# Patient Record
Sex: MALE | Race: WHITE | Employment: OTHER | URBAN - METROPOLITAN AREA
[De-identification: names, ages, dates, MRNs, and addresses within clinical notes are randomized per-mention and may not be internally consistent; named-entity substitution may affect disease eponyms.]

---

## 2019-02-05 ENCOUNTER — DOCUMENTATION ONLY (OUTPATIENT)
Dept: INTERNAL MEDICINE CLINIC | Age: 74
End: 2019-02-05

## 2019-02-05 NOTE — PROGRESS NOTES
Medicare Part B Preventive Services Guidelines/Limitations Date last completed and Frequency Due Date   Bone Mass Measurement  (age 72 & older, biennial) Requires diagnosis related to osteoporosis or estrogen deficiency. Biennial benefit unless patient has history of long-term glucocorticoid tx or baseline is needed because initial test was by other method N/A N/A   Cardiovascular Screening Blood Tests (every 5 years)  Total cholesterol, HDL, Triglycerides Order as a panel if possible Completed 1/2015    Recommended annually Due now   Colorectal Cancer Screening  -Fecal occult blood test (annual)  -Flexible sigmoidoscopy (5y)  -Screening colonoscopy (10y)  -Barium Enema  Completed 2/2016 with Dr Sade Boyle    Recommended in 5 years  Due  2/2021   Counseling to Prevent Tobacco Use (up to 8 sessions per year)  - Counseling greater than 3 and up to 10 minutes  - Counseling greater than 10 minutes Patients must be asymptomatic of tobacco-related conditions to receive as preventive service N/A N/A   Diabetes Screening Tests (at least every 3 years, Medicare covers annually or at 6-month intervals for prediabetic patients)    Fasting blood sugar (FBS) or glucose tolerance test (GTT) Patient must be diagnosed with one of the following:  -Hypertension, Dyslipidemia, obesity, previous impaired FBS or GTT  Or any two of the following: overweight, FH of diabetes, age ? 72, history of gestational diabetes, birth of baby weighing more than 9 pounds Completed in the past      Recommended every 3 years for non-diabetics    Recommended every 3-6 months for Pre-Diabetics and Diabetics Due now   Diabetes Self-Management Training (DSMT) (no USPSTF recommendation) Requires referral by treating physician for patient with diabetes or renal disease. 10 hours of initial DSMT session of no less than 30 minutes each in a continuous 12-month period. 2 hours of follow-up DSMT in subsequent years.  N/A N/A   Glaucoma Screening (no USPSTF recommendation) Diabetes mellitus, family history, , age 48 or over,  American, age 72 or over Completed in the past    Recommended annually Due now   Human Immunodeficiency Virus (HIV) Screening (annually for increased risk patients)  HIV-1 and HIV-2 by EIA, [place], rapid antibody test, or oral mucosa transudate Patient must be at increased risk for HIV infection per USPSTF guidelines or pregnant. Tests covered annually for patients at increased risk. Pregnant patients may receive up to 3 test during pregnancy. N/A N/A   Medical Nutrition Therapy (MNT) (for diabetes or renal disease not recommended schedule) Requires referral by treating physician for patient with diabetes or renal disease. Can be provided in same year as diabetes self-management training (DSMT), and CMS recommends medical nutrition therapy take place after DSMT. Up to 3 hours for initial year and 2 hours in subsequent years. N/A N/A   Prostate Cancer Screening (annually up to age 76)  - Digital rectal exam (SANTOS)  - Prostate specific antigen (PSA) Annually (age 48 or over), SANTOS not paid separately when covered E/M service is provided on same date     Recommended annually to age 76 Due now   Seasonal Influenza Vaccination (annually)  Completed in the past    Recommended annually Due now if not completed this season   Pneumococcal Vaccination (once after 72)  Pneumococcal 21 - 5/2008  Recommended once over the age of 72    Prevnar 15 - 1/2015 Recommended once over the age of 72 Complete          Complete   Hepatitis B Vaccinations (if medium/high risk) Medium/high risk factors:  End-stage renal disease,  Hemophiliacs who received Factor VIII or IX concentrates, Clients of institutions for the mentally retarded, Persons who live in the same house as a HepB virus carrier, Homosexual men, Illicit injectable drug abusers.  N/A N/A               Ultrasound Screening for Abdominal Aortic Aneurysm (AAA) (once) Patient must be referred through IPPE and not have had a screening for abdominal aortic aneurysm before under Medicare.   Limited to patients who meet one of the following criteria:  - Men who are 73-68 years old and have smoked more than 100 cigarettes in their lifetime.  -Anyone with a FH of AAA  -Anyone recommended for screening by USPSTF N/A N/A

## 2019-02-06 ENCOUNTER — HOSPITAL ENCOUNTER (OUTPATIENT)
Dept: LAB | Age: 74
Discharge: HOME | End: 2019-02-06
Payer: MEDICARE

## 2019-02-06 ENCOUNTER — OFFICE VISIT (OUTPATIENT)
Dept: INTERNAL MEDICINE CLINIC | Age: 74
End: 2019-02-06

## 2019-02-06 VITALS
DIASTOLIC BLOOD PRESSURE: 71 MMHG | HEART RATE: 91 BPM | RESPIRATION RATE: 16 BRPM | SYSTOLIC BLOOD PRESSURE: 130 MMHG | WEIGHT: 167 LBS | HEIGHT: 67 IN | BODY MASS INDEX: 26.21 KG/M2 | TEMPERATURE: 97.3 F | OXYGEN SATURATION: 99 %

## 2019-02-06 DIAGNOSIS — R79.89 CREATININE ELEVATION: ICD-10-CM

## 2019-02-06 DIAGNOSIS — G89.29 OTHER CHRONIC PAIN: ICD-10-CM

## 2019-02-06 DIAGNOSIS — E78.5 DYSLIPIDEMIA: Primary | ICD-10-CM

## 2019-02-06 DIAGNOSIS — M25.562 LEFT KNEE PAIN, UNSPECIFIED CHRONICITY: Primary | ICD-10-CM

## 2019-02-06 DIAGNOSIS — N40.0 BENIGN PROSTATIC HYPERPLASIA WITHOUT LOWER URINARY TRACT SYMPTOMS: ICD-10-CM

## 2019-02-06 DIAGNOSIS — G89.29 CHRONIC PAIN OF LEFT KNEE: ICD-10-CM

## 2019-02-06 DIAGNOSIS — E66.3 OVERWEIGHT: ICD-10-CM

## 2019-02-06 DIAGNOSIS — Z00.00 MEDICARE ANNUAL WELLNESS VISIT, SUBSEQUENT: ICD-10-CM

## 2019-02-06 DIAGNOSIS — M25.562 CHRONIC PAIN OF LEFT KNEE: ICD-10-CM

## 2019-02-06 DIAGNOSIS — Z23 ENCOUNTER FOR IMMUNIZATION: ICD-10-CM

## 2019-02-06 PROCEDURE — 80053 COMPREHEN METABOLIC PANEL: CPT

## 2019-02-06 PROCEDURE — 36415 COLL VENOUS BLD VENIPUNCTURE: CPT

## 2019-02-06 PROCEDURE — 85027 COMPLETE CBC AUTOMATED: CPT

## 2019-02-06 PROCEDURE — 84443 ASSAY THYROID STIM HORMONE: CPT

## 2019-02-06 PROCEDURE — 80061 LIPID PANEL: CPT

## 2019-02-06 RX ORDER — NAPROXEN SODIUM 220 MG
220 TABLET ORAL AS NEEDED
COMMUNITY
End: 2021-08-09

## 2019-02-06 NOTE — PROGRESS NOTES
This is the Subsequent Medicare Annual Wellness Exam, performed 12 months or more after the Initial AWV or the last Subsequent AWV I have reviewed the patient's medical history in detail and updated the computerized patient record. History Past Medical History:  
Diagnosis Date  BPH (benign prostatic hyperplasia)  Colon cancer [facility]) 1991  
 colectomy by Dr Aparicio Husbands, no chemo / radiation  Hepatitis A Late 1990's  Pelvis fracture ([address] approx age 56  
 in traction for 6 weeks s/p falling off flag pole onto cement  Skin cancer 2014 Right hand, removed Past Surgical History:  
Procedure Laterality Date [address]  HX COLONOSCOPY  3/2010  HX GI  1991  
 colon removal   
 HX ORTHOPAEDIC  approx age 56  
 right arm with titanium kai, fell from flag pole onto driveway  HX SKIN BIOPSY  09/2014  
 right hand CA, removed  HX TONSILLECTOMY childhood Current Outpatient Medications Medication Sig Dispense Refill  naproxen sodium (ALEVE) 220 mg tablet Take 220 mg by mouth as needed.  varicella-zoster recombinant, PF, (SHINGRIX, PF,) 50 mcg/0.5 mL susr injection 0.5mL by IntraMUSCular route once now and then repeat in 2-6 months 0.5 mL 1  finasteride (PROSCAR) 5 mg tablet Take 5 mg by mouth daily.  red yeast rice extract 600 mg cap Take 600 mg by mouth daily.  omega-3 fatty acids-vitamin e (FISH OIL) 1,000 mg cap Take 1 Cap by mouth daily.  multivitamin (ONE A DAY) tablet Take 1 Tab by mouth daily. No Known Allergies Family History Problem Relation Age of Onset  Hypertension Father  Cancer Paternal Uncle Colon  Cancer Paternal Grandfather Colon  Depression Mother  No Known Problems Sister  Heart Attack Paternal Grandmother  No Known Problems Son   
 
Social History Tobacco Use  Smoking status: Never Smoker  Smokeless tobacco: Never Used Substance Use Topics  Alcohol use: Yes Alcohol/week: 1.2 oz Types: 2 Cans of beer per week Patient Active Problem List  
Diagnosis Code  BPH (benign prostatic hyperplasia) N40.0  PSA elevation R97.20  Dyslipidemia E78.5  Hepatitis K75.9  History of rectal cancer Z85.048  
 Encounter for screening colonoscopy Z12.11 Depression Risk Factor Screening: PHQ over the last two weeks 2/6/2019 Little interest or pleasure in doing things Not at all Feeling down, depressed, irritable, or hopeless Not at all Total Score PHQ 2 0 Alcohol Risk Factor Screening: You do not drink alcohol or very rarely. 2-3 per week Functional Ability and Level of Safety:  
Hearing Loss Hearing is good. Activities of Daily Living The home contains: no safety equipment. Patient does total self care Fall Risk Fall Risk Assessment, last 12 mths 2/6/2019 Able to walk? Yes Fall in past 12 months? No  
 
 
Abuse Screen Patient is not abused Lives with wife, or [place] 
safe Cognitive Screening Evaluation of Cognitive Function: 
Has your family/caregiver stated any concerns about your memory: no 
Normal 
 
Patient Care Team  
Patient Care Team: 
Alexy Brownlee MD as PCP - General (Internal Medicine) Madan Nicole MD (Urology) Ozie Felty, MD (Dermatology) Yasmin Vigil DDS (Dental General Practice) Noel Barraza MD (Colon and Rectal Surgery) Compa Sands MD (Orthopedic Surgery) Madan Nicole MD (Urology)  
updated Assessment/Plan Education and counseling provided: 
Are appropriate based on today's review and evaluation Pneumococcal Vaccine Prostate cancer screening tests (PSA, covered annually) Colorectal cancer screening tests Screening for glaucoma Diabetes screening test 
 
Diagnoses and all orders for this visit: 
 
1. Medicare annual wellness visit, subsequent Other orders 
-     varicella-zoster recombinant, PF, (SHINGRIX, PF,) 50 mcg/0.5 mL susr injection; 0.5mL by IntraMUSCular route once now and then repeat in 2-6 months Health Maintenance Due Topic Date Due  Shingrix Vaccine Age 50> (1 of 2) 02/02/1997  GLAUCOMA SCREENING Q2Y  02/02/2012  Pneumococcal 65+ Low/Medium Risk (2 of 2 - PPSV23) 01/21/2016  MEDICARE YEARLY EXAM  03/14/2018  Influenza Age 5 to Adult  08/01/2018 Discussed with patient about advance medical directive. Provided patient blank AMD and Your Right to Decide Booklet. Requested that if completed to provide a copy of AMD to office. ACP not on file. SDM is his wife, and Lou Fontanez. Provided information today. Colonoscopy: 2/2016, Dr Timo Trujillo, repeat in 5 years PSA: 7/18 2.9, followed with Dr Roderick Adkins 
AAA screen: not needed Tdap: 2013 Pneumovax: 02/06/19 Prevnar 13: 1/2015 Shingrix: ordered 02/06/19 Flu shot: Fall 2018 Eye exam: 2017, every other year Hep C screen: 7/14, negative Lipids: 1/15 , ordered due Fasting glucose due Medication reconciliation completed by MA and reviewed by me. Medical/surgical/social/family history reviewed and updated by me. Patient provided AVS and preventative screening table. Patient verbalized understanding of all information discussed.

## 2019-02-06 NOTE — PROGRESS NOTES
HISTORY OF PRESENT ILLNESS Christianne Douglass is a 69 y.o. male. HPI Pt was previously a patient here in 2015. He is here to re-establish care. BP is 130/71 When he has checked it at a store, SBP has been in 140s Advised pt to check his BP more regularly Wt today is 167 lbs --up 7 lbs x 1/2015 When he is in [place], pt is more active and exercising with swimming, biking However he states he usually gains a few lbs while he is home for the winter Reviewed labs 2015, 2014 Will get labs today Pt sees Dr Brandyn Apple ([facility]) Reviewed notes 7/18: PIN on prostate bx years ago, PSA 2.9, /74, continue finasteride Pt is on proscar Next visit scheduled for 7/19 Pt follows with Dr Abimael Hernandez (derm) for h/o skin cancer Next visit scheduled for 3/19 Pt has been taking aleve for L knee pain x 3 weeks He tried stopping this recently as he did not like taking it every day However it started bothering him when he stopped taking this - not a lot, but enough that it bothered him Discussed using tylenol more rather than aleve Provided exercises to complete at home Ordered XR R knee Pt had moved to [place], but is now spending half his time back in [place] Fully functional independently ACP not on file. SDM is his wife, and Albert Tony. Provided information today. PMHx: Skin cancer Colon cancer BPH Hep A Pelvis fracture FMHx: Father -HTN Mother - depression PSHx: Colon resection Appendectomy Orthopedic Tonsillectomy Skin bx SHx: , 1 child PREVENTIVE: 
Colonoscopy: 2/2016, Dr Racquel Burgess, repeat in 5 years 
aaa screen --not needed PSA: 7/18 2.9, followed with Dr Brandyn Apple Tdap: 2013 Pneumovax: 02/06/19 Prevnar 13: 1/2015 Shingrix: ordered 02/06/19 Flu shot: Fall 2018 Eye exam: 2017, every other year Hep C screen: 7/14, negative Lipids: 1/15 , ordered Patient Active Problem List  
 Diagnosis Date Noted  History of rectal cancer 02/03/2016  Encounter for screening colonoscopy 02/03/2016  BPH (benign prostatic hyperplasia) 07/14/2014  PSA elevation 07/14/2014  Dyslipidemia 07/14/2014  Hepatitis 07/14/2014 Current Outpatient Medications Medication Sig Dispense Refill  naproxen sodium (ALEVE) 220 mg tablet Take 220 mg by mouth as needed.  finasteride (PROSCAR) 5 mg tablet Take 5 mg by mouth daily.  red yeast rice extract 600 mg cap Take 600 mg by mouth daily.  omega-3 fatty acids-vitamin e (FISH OIL) 1,000 mg cap Take 1 Cap by mouth daily.  multivitamin (ONE A DAY) tablet Take 1 Tab by mouth daily. Past Surgical History:  
Procedure Laterality Date [address]  HX COLONOSCOPY  3/2010  HX GI  1991  
 colon removal   
 HX ORTHOPAEDIC  approx age 56  
 right arm with titanium kai, fell from flag pole onto driveway  HX SKIN BIOPSY  09/2014  
 right hand CA, removed  HX TONSILLECTOMY childhood Lab Results Component Value Date/Time WBC 5.1 07/25/2014 10:47 AM  
 HGB 14.8 07/25/2014 10:47 AM  
 HCT 43.0 07/25/2014 10:47 AM  
 PLATELET 578 89/71/3953 10:47 AM  
 MCV 91 07/25/2014 10:47 AM  
 
Lab Results Component Value Date/Time Cholesterol, total 187 01/20/2015 03:43 PM  
 HDL Cholesterol 50 01/20/2015 03:43 PM  
 LDL, calculated 114 (H) 01/20/2015 03:43 PM  
 Triglyceride 117 01/20/2015 03:43 PM  
 
Lab Results Component Value Date/Time GFR est non-AA 57 (L) 01/20/2015 03:43 PM  
 GFR est AA 66 01/20/2015 03:43 PM  
 Creatinine 1.29 (H) 01/20/2015 03:43 PM  
 BUN 20 01/20/2015 03:43 PM  
 Sodium 142 01/20/2015 03:43 PM  
 Potassium 4.4 01/20/2015 03:43 PM  
 Chloride 101 01/20/2015 03:43 PM  
 CO2 23 01/20/2015 03:43 PM  
  
Review of Systems Constitutional: Negative for chills and fever. HENT: Positive for tinnitus. Negative for hearing loss. Eyes: Negative for blurred vision and double vision. Respiratory: Negative for shortness of breath and wheezing. Cardiovascular: Negative for chest pain and palpitations. Gastrointestinal: Negative for nausea and vomiting. Genitourinary: Negative for dysuria and frequency. Musculoskeletal: Positive for joint pain. Negative for back pain and falls. Skin: Negative for itching and rash. Neurological: Negative for dizziness, loss of consciousness and headaches. Psychiatric/Behavioral: Negative for depression. The patient is not nervous/anxious. Physical Exam  
Constitutional: He is oriented to person, place, and time. He appears well-developed and well-nourished. No distress. HENT:  
Head: Normocephalic and atraumatic. Right Ear: External ear normal.  
Left Ear: External ear normal.  
Mouth/Throat: Oropharynx is clear and moist. No oropharyngeal exudate. Eyes: Conjunctivae and EOM are normal. Pupils are equal, round, and reactive to light. Right eye exhibits no discharge. Left eye exhibits no discharge. No scleral icterus. Neck: Normal range of motion. Neck supple. No carotid bruits Cardiovascular: Normal rate, regular rhythm, normal heart sounds and intact distal pulses. Exam reveals no gallop and no friction rub. No murmur heard. Pulmonary/Chest: Effort normal and breath sounds normal. No respiratory distress. He has no wheezes. He has no rales. He exhibits no tenderness. Abdominal: Soft. He exhibits no distension and no mass. There is no tenderness. There is no rebound and no guarding. Small umbilical hernia Musculoskeletal: Normal range of motion. He exhibits no edema, tenderness or deformity. Some crepitus in R knee Lymphadenopathy:  
  He has no cervical adenopathy. Neurological: He is alert and oriented to person, place, and time. He has normal reflexes. Coordination normal.  
Skin: Skin is warm and dry. No rash noted. He is not diaphoretic. No erythema. No pallor. Psychiatric: He has a normal mood and affect. His behavior is normal.  
 
 
ASSESSMENT and PLAN 
  ICD-10-CM ICD-9-CM 1. Dyslipidemia Diet controlled, check lipids today and adjust prn 
 E78.5 272.4 LIPID PANEL  
   METABOLIC PANEL, COMPREHENSIVE  
   CBC W/O DIFF  
   TSH 3RD GENERATION 2. Medicare annual wellness visit, subsequent Z00.00 V70.0 LIPID PANEL  
   METABOLIC PANEL, COMPREHENSIVE  
   CBC W/O DIFF  
   TSH 3RD GENERATION 3. Benign prostatic hyperplasia without lower urinary tract symptoms Follows with Dr Vivia Gottron, stable on proscar, will see him this summer N40.0 600.00 LIPID PANEL  
   METABOLIC PANEL, COMPREHENSIVE  
   CBC W/O DIFF  
   TSH 3RD GENERATION 4. Overweight Up a few lbs with the winter, has not been as active, will work on portion control and increasing activity level E66.3 278.02 LIPID PANEL  
   METABOLIC PANEL, COMPREHENSIVE  
   CBC W/O DIFF  
   TSH 3RD GENERATION 5. Creatinine elevation Mild bump last check, will repeat cr today to ensure stable R79.89 790.99 LIPID PANEL  
   METABOLIC PANEL, COMPREHENSIVE  
   CBC W/O DIFF  
   TSH 3RD GENERATION 6. Chronic pain of left knee Likely arthritic, may benefit from strengthening exercises, will get plain film and printed off exercises for today, will have him avoid aleve as much as possible and focus on tylenol M25.562 719.46 XR KNEE LT MAX 2 VWS  
 G89.29 338.29   
 dep screen neg Scribed by Tom Alexis of [address] [place] [address] [address], as dictated by Dr. Dennis Godoy. Current diagnosis and concerns discussed with pt at length. Pt understands risks and benefits or current treatment plan and medications, and accepts the treatment and medication with any possible risks. Pt asks appropriate questions, which were answered. Pt was instructed to call with any concerns or problems. I have reviewed the note documented by the scribe. The services provided are my own. The documentation is accurate

## 2019-02-06 NOTE — PATIENT INSTRUCTIONS
Medicare Wellness Visit, Male The best way to live healthy is to have a lifestyle where you eat a well-balanced diet, exercise regularly, limit alcohol use, and quit all forms of tobacco/nicotine, if applicable. Regular preventive services are another way to keep healthy. Preventive services (vaccines, screening tests, monitoring & exams) can help personalize your care plan, which helps you manage your own care. Screening tests can find health problems at the earliest stages, when they are easiest to treat. [facility] follows the current, evidence-based guidelines published by the [facility] ([facility]) when recommending preventive services for our patients. Because we follow these guidelines, sometimes recommendations change over time as research supports it. (For example, a prostate screening blood test is no longer routinely recommended for men with no symptoms.) Of course, you and your doctor may decide to screen more often for some diseases, based on your risk and co-morbidities (chronic disease you are already diagnosed with). Preventive services for you include: - Medicare offers their members a free annual wellness visit, which is time for you and your primary care provider to discuss and plan for your preventive service needs. Take advantage of this benefit every year! 
-All adults over age 72 should receive the recommended pneumonia vaccines. Current USPSTF guidelines recommend a series of two vaccines for the best pneumonia protection.  
-All adults should have a flu vaccine yearly and an ECG.  All adults age 61 and older should receive a shingles vaccine once in their lifetime.   
-All adults age 38-68 who are overweight should have a diabetes screening test once every three years.  
-Other screening tests & preventive services for persons with diabetes include: an eye exam to screen for diabetic retinopathy, a kidney function test, a foot exam, and stricter control over your cholesterol.  
-Cardiovascular screening for adults with routine risk involves an electrocardiogram (ECG) at intervals determined by the provider.  
-Colorectal cancer screening should be done for adults age 54-65 with no increased risk factors for colorectal cancer. There are a number of acceptable methods of screening for this type of cancer. Each test has its own benefits and drawbacks. Discuss with your provider what is most appropriate for you during your annual wellness visit. The different tests include: colonoscopy (considered the best screening method), a fecal occult blood test, a fecal DNA test, and sigmoidoscopy. 
-All adults born between [facility] should be screened once for Hepatitis C. 
-An Abdominal Aortic Aneurysm (AAA) Screening is recommended for men age 73-68 who has ever smoked in their lifetime. Here is a list of your current Health Maintenance items (your personalized list of preventive services) with a due date: 
Health Maintenance Due Topic Date Due  Shingles Vaccine (1 of 2) 02/02/1997  Glaucoma Screening   02/02/2012  Pneumococcal Vaccine (2 of 2 - PPSV23) 01/21/2016 [address] [place] [address] Annual Well Visit  03/14/2018  Flu Vaccine  08/01/2018 Medicare Part B Preventive Services Guidelines/Limitations Date last completed and Frequency Due Date Bone Mass Measurement 
(age 72 & older, biennial) Requires diagnosis related to osteoporosis or estrogen deficiency. Biennial benefit unless patient has history of long-term glucocorticoid tx or baseline is needed because initial test was by other method N/A N/A Cardiovascular Screening Blood Tests (every 5 years) Total cholesterol, HDL, Triglycerides Order as a panel if possible Completed 1/2015 
  
Recommended annually Due now Colorectal Cancer Screening 
-Fecal occult blood test (annual) -Flexible sigmoidoscopy (5y) 
-Screening colonoscopy (10y) -Barium Enema   Completed 2/2016 with Dr Yuly Araiza 
  
Recommended in 5 years  Due  2/2021 Counseling to Prevent Tobacco Use (up to 8 sessions per year) - Counseling greater than 3 and up to 10 minutes - Counseling greater than 10 minutes Patients must be asymptomatic of tobacco-related conditions to receive as preventive service N/A N/A Diabetes Screening Tests (at least every 3 years, Medicare covers annually or at 6-month intervals for prediabetic patients) 
  Fasting blood sugar (FBS) or glucose tolerance test (GTT) Patient must be diagnosed with one of the following: 
-Hypertension, Dyslipidemia, obesity, previous impaired FBS or GTT 
Or any two of the following: overweight, FH of diabetes, age ? 72, history of gestational diabetes, birth of baby weighing more than 9 pounds Completed in the past 
  
  
Recommended every 3 years for non-diabetics 
  
Recommended every 3-6 months for Pre-Diabetics and Diabetics Due now Diabetes Self-Management Training (DSMT) (no USPSTF recommendation) Requires referral by treating physician for patient with diabetes or renal disease. 10 hours of initial DSMT session of no less than 30 minutes each in a continuous 12-month period. 2 hours of follow-up DSMT in subsequent years. N/A N/A Glaucoma Screening (no USPSTF recommendation) Diabetes mellitus, family history, , age 48 or over,  American, age 72 or over Completed 2017 
  
Recommended annually Due this year Human Immunodeficiency Virus (HIV) Screening (annually for increased risk patients) HIV-1 and HIV-2 by EIA, MONROE, rapid antibody test, or oral mucosa transudate Patient must be at increased risk for HIV infection per USPSTF guidelines or pregnant. Tests covered annually for patients at increased risk. Pregnant patients may receive up to 3 test during pregnancy. N/A N/A Medical Nutrition Therapy (MNT) (for diabetes or renal disease not recommended schedule) Requires referral by treating physician for patient with diabetes or renal disease. Can be provided in same year as diabetes self-management training (DSMT), and CMS recommends medical nutrition therapy take place after DSMT. Up to 3 hours for initial year and 2 hours in subsequent years. N/A N/A Prostate Cancer Screening (annually up to age 76) - Digital rectal exam (SANTOS) - Prostate specific antigen (PSA) Annually (age 48 or over), SANTOS not paid separately when covered E/M service is provided on same date  5/23 
  
Recommended annually to age 76 Due annually Seasonal Influenza Vaccination (annually)   Completed Fall 2018 
  
Recommended annually Due Fall 2019 Pneumococcal Vaccination (once after 65)   Pneumococcal 23 - 5/2008 Repeat today 2/19 Recommended once over the age of 72 
  
Prevnar 15 - 1/2015 Recommended once over the age of 72 Complete 
  
  
  
  
Complete Hepatitis B Vaccinations (if medium/high risk) Medium/high risk factors:  End-stage renal disease, Hemophiliacs who received Factor VIII or IX concentrates, Clients of institutions for the mentally retarded, Persons who live in the same house as a HepB virus carrier, Homosexual men, Illicit injectable drug abusers. N/A N/A  
         
         
Ultrasound Screening for Abdominal Aortic Aneurysm (AAA) (once) Patient must be referred through IPPE and not have had a screening for abdominal aortic aneurysm before under Medicare. Limited to patients who meet one of the following criteria: 
- Men who are 73-68 years old and have smoked more than 100 cigarettes in their lifetime. 
-Anyone with a FH of AAA 
-Anyone recommended for screening by USPSTF N/A N/A Please bring in a copy of your advanced directive to your next office visit so we can have a copy on file.

## 2019-02-07 LAB
ALBUMIN SERPL-MCNC: 4.7 G/DL (ref 3.5–4.8)
ALBUMIN/GLOB SERPL: 1.7 {RATIO} (ref 1.2–2.2)
ALP SERPL-CCNC: 78 IU/L (ref 39–117)
ALT SERPL-CCNC: 21 IU/L (ref 0–44)
AST SERPL-CCNC: 26 IU/L (ref 0–40)
BILIRUB SERPL-MCNC: 0.6 MG/DL (ref 0–1.2)
BUN SERPL-MCNC: 20 MG/DL (ref 8–27)
BUN/CREAT SERPL: 17 (ref 10–24)
CALCIUM SERPL-MCNC: 9.9 MG/DL (ref 8.6–10.2)
CHLORIDE SERPL-SCNC: 106 MMOL/L (ref 96–106)
CHOLEST SERPL-MCNC: 195 MG/DL (ref 100–199)
CO2 SERPL-SCNC: 25 MMOL/L (ref 20–29)
CREAT SERPL-MCNC: 1.18 MG/DL (ref 0.76–1.27)
ERYTHROCYTE [DISTWIDTH] IN BLOOD BY AUTOMATED COUNT: 13.8 % (ref 12.3–15.4)
GLOBULIN SER CALC-MCNC: 2.8 G/DL (ref 1.5–4.5)
GLUCOSE SERPL-MCNC: 92 MG/DL (ref 65–99)
HCT VFR BLD AUTO: 41.8 % (ref 37.5–51)
HDLC SERPL-MCNC: 54 MG/DL
HGB BLD-MCNC: 14.2 G/DL (ref 13–17.7)
LDLC SERPL CALC-MCNC: 127 MG/DL (ref 0–99)
MCH RBC QN AUTO: 30.8 PG (ref 26.6–33)
MCHC RBC AUTO-ENTMCNC: 34 G/DL (ref 31.5–35.7)
MCV RBC AUTO: 91 FL (ref 79–97)
PLATELET # BLD AUTO: 191 X10E3/UL (ref 150–379)
POTASSIUM SERPL-SCNC: 4.7 MMOL/L (ref 3.5–5.2)
PROT SERPL-MCNC: 7.5 G/DL (ref 6–8.5)
RBC # BLD AUTO: 4.61 X10E6/UL (ref 4.14–5.8)
SODIUM SERPL-SCNC: 144 MMOL/L (ref 134–144)
TRIGL SERPL-MCNC: 72 MG/DL (ref 0–149)
TSH SERPL DL<=0.005 MIU/L-ACNC: 2.66 UIU/ML (ref 0.45–4.5)
VLDLC SERPL CALC-MCNC: 14 MG/DL (ref 5–40)
WBC # BLD AUTO: 5.5 X10E3/UL (ref 3.4–10.8)

## 2020-02-11 ENCOUNTER — DOCUMENTATION ONLY (OUTPATIENT)
Dept: INTERNAL MEDICINE CLINIC | Age: 75
End: 2020-02-11

## 2020-02-11 NOTE — PROGRESS NOTES
Medicare Part B Preventive Services Guidelines/Limitations Date last completed and Frequency Due Date   Bone Mass Measurement  (age 72 & older, biennial) Requires diagnosis related to osteoporosis or estrogen deficiency. Biennial benefit unless patient has history of long-term glucocorticoid tx or baseline is needed because initial test was by other method N/A N/A   Cardiovascular Screening Blood Tests (every 5 years)  Total cholesterol, HDL, Triglycerides Order as a panel if possible Completed 2/2019     Recommended annually Due now   Colorectal Cancer Screening  -Fecal occult blood test (annual)  -Flexible sigmoidoscopy (5y)  -Screening colonoscopy (10y)  -Barium Enema   Completed 2/2016 with Dr Jonna Burch 5 years  Due  2/2021   Counseling to Prevent Tobacco Use (up to 8 sessions per year)  - Counseling greater than 3 and up to 10 minutes  - Counseling greater than 10 minutes Patients must be asymptomatic of tobacco-related conditions to receive as preventive service N/A N/A   Diabetes Screening Tests (at least every 3 years, Medicare covers annually or at 6-month intervals for prediabetic patients)     Fasting blood sugar (FBS) or glucose tolerance test (GTT) Patient must be diagnosed with one of the following:  -Hypertension, Dyslipidemia, obesity, previous impaired FBS or GTT  Or any two of the following: overweight, FH of diabetes, age ? 72, history of gestational diabetes, birth of baby weighing more than 9 pounds Due 2/2019 with glu 92         Recommended every 3 years for non-diabetics     Recommended every 3-6 months for Pre-Diabetics and Diabetics Due now   Diabetes Self-Management Training (DSMT) (no USPSTF recommendation) Requires referral by treating physician for patient with diabetes or renal disease. 10 hours of initial DSMT session of no less than 30 minutes each in a continuous 12-month period.  2 hours of follow-up DSMT in subsequent years.  N/A N/A   Glaucoma Screening (no USPSTF recommendation) Diabetes mellitus, family history, , age 48 or over,  American, age 72 or over [address]     Recommended annually Due now    Human Immunodeficiency Virus (HIV) Screening (annually for increased risk patients)  HIV-1 and HIV-2 by EIA, MONROE, rapid antibody test, or oral mucosa transudate Patient must be at increased risk for HIV infection per USPSTF guidelines or pregnant.  Tests covered annually for patients at increased risk.  Pregnant patients may receive up to 3 test during pregnancy. N/A N/A   Medical Nutrition Therapy (MNT) (for diabetes or renal disease not recommended schedule) Requires referral by treating physician for patient with diabetes or renal disease.  Can be provided in same year as diabetes self-management training (DSMT), and CMS recommends medical nutrition therapy take place after DSMT.  Up to 3 hours for initial year and 2 hours in subsequent years. N/A N/A   Prostate Cancer Screening (annually up to age 76)  - Digital rectal exam (SANTOS)  - Prostate specific antigen (PSA) Annually (age 48 or over), SANTOS not paid separately when covered E/M service is provided on same date  5/23     Recommended annually to age 76 Due annually or as directed by Dr Eulogio Hameed Vaccination (annually)   Completed Fall 2018     Recommended annually Due Fall 2019   Pneumococcal Vaccination (once after 72)   Pneumococcal 21 - 5/2008     Repeat today 2/19  Recommended once over the age of 72     Prevnar 15 - 1/2015 Recommended once over the age of 72 Complete              Complete   Hepatitis B Vaccinations (if medium/high risk) Medium/high risk factors:  End-stage renal disease,  Hemophiliacs who received Factor VIII or IX concentrates, Clients of institutions for the mentally retarded, Persons who live in the same house as a HepB virus carrier, Homosexual men, Illicit injectable drug abusers.  N/A N/A                       Ultrasound Screening for Abdominal Aortic Aneurysm (AAA) (once) Patient must be referred through IPPE and not have had a screening for abdominal aortic aneurysm before under Medicare.  Limited to patients who meet one of the following criteria:  - Men who are 73-68 years old and have smoked more than 100 cigarettes in their lifetime.  -Anyone with a FH of AAA  -Anyone recommended for screening by USPSTF N/A N/A

## 2020-02-12 ENCOUNTER — OFFICE VISIT (OUTPATIENT)
Dept: INTERNAL MEDICINE CLINIC | Age: 75
End: 2020-02-12

## 2020-02-12 ENCOUNTER — HOSPITAL ENCOUNTER (OUTPATIENT)
Dept: LAB | Age: 75
Discharge: HOME | End: 2020-02-12
Payer: MEDICARE

## 2020-02-12 VITALS
OXYGEN SATURATION: 98 % | WEIGHT: 165 LBS | SYSTOLIC BLOOD PRESSURE: 122 MMHG | RESPIRATION RATE: 16 BRPM | TEMPERATURE: 97.6 F | DIASTOLIC BLOOD PRESSURE: 61 MMHG | HEIGHT: 67 IN | BODY MASS INDEX: 25.9 KG/M2 | HEART RATE: 73 BPM

## 2020-02-12 DIAGNOSIS — Z00.00 MEDICARE ANNUAL WELLNESS VISIT, SUBSEQUENT: ICD-10-CM

## 2020-02-12 DIAGNOSIS — E66.3 OVERWEIGHT: ICD-10-CM

## 2020-02-12 DIAGNOSIS — E78.5 DYSLIPIDEMIA: Primary | ICD-10-CM

## 2020-02-12 DIAGNOSIS — N40.0 BENIGN PROSTATIC HYPERPLASIA WITHOUT LOWER URINARY TRACT SYMPTOMS: ICD-10-CM

## 2020-02-12 DIAGNOSIS — R97.20 PSA ELEVATION: ICD-10-CM

## 2020-02-12 DIAGNOSIS — Z85.048 HISTORY OF RECTAL CANCER: ICD-10-CM

## 2020-02-12 PROCEDURE — 85027 COMPLETE CBC AUTOMATED: CPT

## 2020-02-12 PROCEDURE — 36415 COLL VENOUS BLD VENIPUNCTURE: CPT

## 2020-02-12 PROCEDURE — 80053 COMPREHEN METABOLIC PANEL: CPT

## 2020-02-12 PROCEDURE — 80061 LIPID PANEL: CPT

## 2020-02-12 PROCEDURE — 84443 ASSAY THYROID STIM HORMONE: CPT

## 2020-02-12 NOTE — PROGRESS NOTES
HISTORY OF PRESENT ILLNESS  Jacki Pavon is a 70 y.o. male. HPI   Pt was last here 2/6/19. Pt is here for routine care.      BP is 122/61   When he has checked it at a store, SBP has been in 140s  Advised pt to check his BP more regularly     Wt today is 165 lbs-- down 2 lbs xlov   Discussed his wt is slightly above nl ranges but ok for age        Reviewed labs 2/19  Will get labs today      Pt sees Dr Elisabeth Del Rio ([facility])  Last visit was 7/19, everything fine per pt no changes   Pt is on proscar 5 mg      Pt follows with Dr Noemy Otero (derm) for h/o skin cancer  Last visit was 3/19   Now follows with new derm Dr Yahir Parker   Last visit was 1/20   States he will be following with Dr Mariama Riddle at next visit     Pt had L thumb trigger finger   Saw Dr Tomi Gillette (ortho) for this   Last visit was 7/19          Lives with his wife     Pt is functionally independent     No memory concerns   Knows the month, day, year   Can recall 3/3 objects       ACP not on file. SDM is his wife, and Lyle Monsivais. Provided information today.         PREVENTIVE:  Colonoscopy: 2/2016, Dr Dania Harvey, repeat in 5 years, provided info for new GI, had colon resection in [address] [place] [address] Extension screen --not needed  PSA: 7/18 2.9, followed with Dr Elisabeth Del Rio, 7/19 followed by dr Dong Sifuentes   Tdap: 2013  Pneumovax: 02/06/19   Prevnar 13: 1/2015  Shingrix: both rounds completed   Flu shot: Fall 2019   Eye exam: summer 2019   Hep C screen: 7/14, negative  Lipids: 2/19         Patient Active Problem List    Diagnosis Date Noted    History of rectal cancer 02/03/2016    Encounter for screening colonoscopy 02/03/2016    BPH (benign prostatic hyperplasia) 07/14/2014    PSA elevation 07/14/2014    Dyslipidemia 07/14/2014    Hepatitis 07/14/2014     Current Outpatient Medications   Medication Sig Dispense Refill    naproxen sodium (ALEVE) 220 mg tablet Take 220 mg by mouth as needed.       varicella-zoster recombinant, PF, (SHINGRIX, PF,) 50 mcg/0.5 mL susr injection 0.5mL by IntraMUSCular route once now and then repeat in 2-6 months 0.5 mL 1    finasteride (PROSCAR) 5 mg tablet Take 5 mg by mouth daily.  red yeast rice extract 600 mg cap Take 600 mg by mouth daily.  omega-3 fatty acids-vitamin e (FISH OIL) 1,000 mg cap Take 1 Cap by mouth daily.  multivitamin (ONE A DAY) tablet Take 1 Tab by mouth daily. Past Surgical History:   Procedure Laterality Date    HX APPENDECTOMY  1962    HX COLONOSCOPY  3/2010    HX GI  1991    colon removal     HX ORTHOPAEDIC  approx age 56    right arm with titanium kai, fell from [employer] onto driveway    HX SKIN BIOPSY  09/2014    right hand CA, removed    HX TONSILLECTOMY      childhood      Lab Results   Component Value Date/Time    WBC 5.5 02/06/2019 10:15 AM    HGB 14.2 02/06/2019 10:15 AM    HCT 41.8 02/06/2019 10:15 AM    PLATELET 035 82/05/4294 10:15 AM    MCV 91 02/06/2019 10:15 AM     Lab Results   Component Value Date/Time    Cholesterol, total 195 02/06/2019 10:15 AM    HDL Cholesterol 54 02/06/2019 10:15 AM    LDL, calculated 127 (H) 02/06/2019 10:15 AM    Triglyceride 72 02/06/2019 10:15 AM     Lab Results   Component Value Date/Time    GFR est non-AA 61 02/06/2019 10:15 AM    GFR est AA 71 02/06/2019 10:15 AM    Creatinine 1.18 02/06/2019 10:15 AM    BUN 20 02/06/2019 10:15 AM    Sodium 144 02/06/2019 10:15 AM    Potassium 4.7 02/06/2019 10:15 AM    Chloride 106 02/06/2019 10:15 AM    CO2 25 02/06/2019 10:15 AM        Review of Systems   Respiratory: Negative for shortness of breath. Cardiovascular: Negative for chest pain. Physical Exam  Constitutional:       General: He is not in acute distress. Appearance: He is well-developed. He is not diaphoretic. HENT:      Head: Normocephalic and atraumatic.       Right Ear: Tympanic membrane, ear canal and external ear normal.      Left Ear: Tympanic membrane, ear canal and external ear normal.      Mouth/Throat:      Mouth: Mucous membranes are moist.      Pharynx: Oropharynx is clear. No oropharyngeal exudate or posterior oropharyngeal erythema. Eyes:      General: No scleral icterus. Right eye: No discharge. Left eye: No discharge. Conjunctiva/sclera: Conjunctivae normal.   Neck:      Musculoskeletal: Normal range of motion and neck supple. Vascular: No carotid bruit. Cardiovascular:      Rate and Rhythm: Normal rate and regular rhythm. Heart sounds: Normal heart sounds. No murmur. No friction rub. No gallop. Pulmonary:      Effort: Pulmonary effort is normal. No respiratory distress. Breath sounds: Normal breath sounds. No wheezing or rales. Chest:      Chest wall: No tenderness. Abdominal:      General: There is no distension. Palpations: There is no mass. Tenderness: There is no abdominal tenderness. There is no guarding or rebound. Hernia: A hernia (umbilical) is present. Comments: Diastasis of rectus muscles      Musculoskeletal: Normal range of motion. General: No tenderness or deformity. Right lower leg: No edema. Left lower leg: No edema. Lymphadenopathy:      Cervical: No cervical adenopathy. Skin:     General: Skin is warm and dry. Coloration: Skin is not pale. Findings: No erythema or rash. Neurological:      Mental Status: He is alert and oriented to person, place, and time. Coordination: Coordination normal.      Deep Tendon Reflexes: Reflexes are normal and symmetric. Psychiatric:         Mood and Affect: Mood normal.         Behavior: Behavior normal.         ASSESSMENT and PLAN    ICD-10-CM ICD-9-CM    1. Dyslipidemia          Diet controlled check lipids today  E78.5 272.4 LIPID PANEL      METABOLIC PANEL, COMPREHENSIVE      CBC W/O DIFF      TSH 3RD GENERATION   2. Medicare annual wellness visit, subsequent Z00.00 V70.0 LIPID PANEL      METABOLIC PANEL, COMPREHENSIVE      CBC W/O DIFF      TSH 3RD GENERATION   3. Benign prostatic hyperplasia without lower urinary tract symptoms        sxs improved with proscar  N40.0 600.00 LIPID PANEL      METABOLIC PANEL, COMPREHENSIVE      CBC W/O DIFF      TSH 3RD GENERATION   4. PSA elevation          Has PIN follows with dr Tiwari Mercury will see him again this summer  R97.20 790.93 LIPID PANEL      METABOLIC PANEL, COMPREHENSIVE      CBC W/O DIFF      TSH 3RD GENERATION   5. History of rectal cancer        In remission due for colo next year  Z85.048 V10.06 LIPID PANEL      METABOLIC PANEL, COMPREHENSIVE      CBC W/O DIFF      TSH 3RD GENERATION      REFERRAL TO GASTROENTEROLOGY   6. Overweight            Counseled on wt loss  E66.3 278.02 LIPID PANEL      METABOLIC PANEL, COMPREHENSIVE      CBC W/O DIFF      TSH 3RD GENERATION        Depression screen reviewed and negative. Scribed by Primitivo Winston of [facility], as dictated by Dr. Zak Ro. Current diagnosis and concerns discussed with pt at length. Pt understands risks and benefits or current treatment plan and medications, and accepts the treatment and medication with any possible risks. Pt asks appropriate questions, which were answered. Pt was instructed to call with any concerns or problems. I have reviewed the note documented by the scribe. The services provided are my own.   The documentation is accurate

## 2020-02-12 NOTE — PROGRESS NOTES
This is the Subsequent Medicare Annual Wellness Exam, performed 12 months or more after the Initial AWV or the last Subsequent AWV    I have reviewed the patient's medical history in detail and updated the computerized patient record. History     Patient Active Problem List   Diagnosis Code    BPH (benign prostatic hyperplasia) N40.0    PSA elevation R97.20    Dyslipidemia E78.5    Hepatitis K75.9    History of rectal cancer Z85.048    Encounter for screening colonoscopy Z12.11     Past Medical History:   Diagnosis Date    BPH (benign prostatic hyperplasia)     Colon cancer ([facility] [address] 1991    colectomy by Dr Torri Dior, no chemo / radiation    Hepatitis A Late 1's    Pelvis fracture ([facility] [address] approx age 56    in traction for 6 weeks s/p falling off flag pole onto cement     Skin cancer 2014    Right hand, removed      Past Surgical History:   Procedure Laterality Date    HX APPENDECTOMY  1962    HX COLONOSCOPY  3/2010    HX GI  1991    colon removal     HX ORTHOPAEDIC  approx age 56    right arm with titanium kai, fell from flag pole onto driveway    HX SKIN BIOPSY  09/2014    right hand CA, removed    HX TONSILLECTOMY      childhood     Current Outpatient Medications   Medication Sig Dispense Refill    varicella-zoster recombinant, PF, (SHINGRIX, PF,) 50 mcg/0.5 mL susr injection 0.5mL by IntraMUSCular route once now and then repeat in 2-6 months 0.5 mL 1    naproxen sodium (ALEVE) 220 mg tablet Take 220 mg by mouth as needed.  varicella-zoster recombinant, PF, (SHINGRIX, PF,) 50 mcg/0.5 mL susr injection 0.5mL by IntraMUSCular route once now and then repeat in 2-6 months 0.5 mL 1    finasteride (PROSCAR) 5 mg tablet Take 5 mg by mouth daily.  red yeast rice extract 600 mg cap Take 600 mg by mouth daily.  omega-3 fatty acids-vitamin e (FISH OIL) 1,000 mg cap Take 1 Cap by mouth daily.  multivitamin (ONE A DAY) tablet Take 1 Tab by mouth daily.        No Known Allergies    Family History   Problem Relation Age of Onset    Hypertension Father     Cancer Paternal Uncle         Colon    Cancer Paternal Grandfather         Colon    Depression Mother     No Known Problems Sister     Heart Attack Paternal Grandmother     No Known Problems Son      Social History     Tobacco Use    Smoking status: Never Smoker    Smokeless tobacco: Never Used   Substance Use Topics    Alcohol use: Yes     Alcohol/week: 2.0 standard drinks     Types: 2 Cans of beer per week       Depression Risk Factor Screening:     3 most recent PHQ Screens 2/12/2020   Little interest or pleasure in doing things Not at all   Feeling down, depressed, irritable, or hopeless Not at all   Total Score PHQ 2 0       Alcohol Risk Factor Screening (MALE > 65): Do you average more 1 drink per night or more than 7 drinks a week: No    In the past three months have you have had more than 4 drinks containing alcohol on one occasion: No  Occasional alcohol     Functional Ability and Level of Safety:   Hearing: Hearing is good. Activities of Daily Living: The home contains: no safety equipment. Patient does total self care    Ambulation: with no difficulty    Fall Risk:  Fall Risk Assessment, last 12 mths 2/12/2020   Able to walk? Yes   Fall in past 12 months?  No       Abuse Screen:  Patient is not abused  Lives with wife  dafe  Cognitive Screening   Has your family/caregiver stated any concerns about your memory: no  Cognitive Screening: Normal - Mini Cog Test    Patient Care Team   Patient Care Team:  Karyle Berger, MD as PCP - General (Internal Medicine)  Karyle Berger, MD as PCP - [facility] [place] [facility]  Korey Banks MD (Urology)  Seema Arcos MD (Dermatology)  Hal Davis DDS (Dental General Practice)  Michael Zamora MD (Colon and Rectal Surgery)  Annamarie Sánchez MD (Orthopedic Surgery)  Korey Banks MD (Urology)  Jeannette Enriquez MD (Hand Surgery) updated    Assessment/Plan   Education and counseling provided:  Are appropriate based on today's review and evaluation  End-of-Life planning (with patient's consent)  Influenza Vaccine  Colorectal cancer screening tests  Cardiovascular screening blood test  Screening for glaucoma  Diabetes screening test    Diagnoses and all orders for this visit:    1. Medicare annual wellness visit, subsequent    Other orders  -     varicella-zoster recombinant, PF, (SHINGRIX, PF,) 50 mcg/0.5 mL susr injection; 0.5mL by IntraMUSCular route once now and then repeat in 2-6 months        Health Maintenance Due   Topic Date Due    Shingrix Vaccine Age 49> (1 of 2) 02/02/1997    GLAUCOMA SCREENING Q2Y  02/02/2012    Influenza Age 9 to Adult  08/01/2019    Medicare Yearly Exam  02/07/2020     Discussed with patient about advance medical directive. Provided patient blank AMD and Your Right to Decide Booklet. Requested that if completed to provide a copy of AMD to office. ACP not on file. SDM is his wife, and Brian Bernabe. Provided information today.           Colonoscopy: 2/2016, Dr Cary Ramirez, repeat in 5 years, provided info for new GI, had colon resection in 1991   aaa screen --not needed  PSA: 7/18 2.9, followed with Dr Carissa Michael, 7/19 followed by dr Yamilka Cortes     Tdap: 2013  Pneumovax: 02/06/19   Prevnar 13: 1/2015  Shingrix: both rounds completed 2019  Flu shot: Fall 2019     Eye exam: summer 2019 annual     Fasting glucose 2/19 due  Hep C screen: 7/14, negative  Lipids: 2/19 ordered       Medication reconciliation completed by MA and reviewed by me. Medical/surgical/social/family history reviewed and updated by me. Patient provided AVS and preventative screening table. Patient verbalized understanding of all information discussed.

## 2020-02-12 NOTE — PATIENT INSTRUCTIONS
Medicare Wellness Visit, Male The best way to live healthy is to have a lifestyle where you eat a well-balanced diet, exercise regularly, limit alcohol use, and quit all forms of tobacco/nicotine, if applicable. Regular preventive services are another way to keep healthy. Preventive services (vaccines, screening tests, monitoring & exams) can help personalize your care plan, which helps you manage your own care. Screening tests can find health problems at the earliest stages, when they are easiest to treat. Eleanorjosé manuel follows the current, evidence-based guidelines published by the [facility] H[facility]ctor Antony ([facility]) when recommending preventive services for our patients. Because we follow these guidelines, sometimes recommendations change over time as research supports it. (For example, a prostate screening blood test is no longer routinely recommended for men with no symptoms). Of course, you and your doctor may decide to screen more often for some diseases, based on your risk and co-morbidities (chronic disease you are already diagnosed with). Preventive services for you include: - Medicare offers their members a free annual wellness visit, which is time for you and your primary care provider to discuss and plan for your preventive service needs. Take advantage of this benefit every year! 
-All adults over age 72 should receive the recommended pneumonia vaccines. Current USPSTF guidelines recommend a series of two vaccines for the best pneumonia protection.  
-All adults should have a flu vaccine yearly and tetanus vaccine every 10 years. 
-All adults age 48 and older should receive the shingles vaccines (series of two vaccines).       
-All adults age 38-68 who are overweight should have a diabetes screening test once every three years.  
-Other screening tests & preventive services for persons with diabetes include: an eye exam to screen for diabetic retinopathy, a kidney function test, a foot exam, and stricter control over your cholesterol.  
-Cardiovascular screening for adults with routine risk involves an electrocardiogram (ECG) at intervals determined by the provider.  
-Colorectal cancer screening should be done for adults age 54-65 with no increased risk factors for colorectal cancer. There are a number of acceptable methods of screening for this type of cancer. Each test has its own benefits and drawbacks. Discuss with your provider what is most appropriate for you during your annual wellness visit. The different tests include: colonoscopy (considered the best screening method), a fecal occult blood test, a fecal DNA test, and sigmoidoscopy. 
-All adults born between [facility] should be screened once for Hepatitis C. 
-An Abdominal Aortic Aneurysm (AAA) Screening is recommended for men age 73-68 who has ever smoked in their lifetime. Here is a list of your current Health Maintenance items (your personalized list of preventive services) with a due date: 
Health Maintenance Due Topic Date Due  Shingles Vaccine (1 of 2) 02/02/1997  Glaucoma Screening   02/02/2012  Flu Vaccine  08/01/2019 Kemi Clement Annual Well Visit  02/07/2020 Medicare Part B Preventive Services Guidelines/Limitations Date last completed and Frequency Due Date Bone Mass Measurement 
(age 72 & older, biennial) Requires diagnosis related to osteoporosis or estrogen deficiency. Biennial benefit unless patient has history of long-term glucocorticoid tx or baseline is needed because initial test was by other method N/A N/A Cardiovascular Screening Blood Tests (every 5 years) Total cholesterol, HDL, Triglycerides Order as a panel if possible Completed 2/2019 
  
Recommended annually Due now Colorectal Cancer Screening 
-Fecal occult blood test (annual) -Flexible sigmoidoscopy (5y) 
-Screening colonoscopy (10y) -Barium Enema   Completed 2/2016 with Dr Warren Rolling 5 years  [facility]  6/2429 Counseling to Prevent Tobacco Use (up to 8 sessions per year) - Counseling greater than 3 and up to 10 minutes - Counseling greater than 10 minutes Patients must be asymptomatic of tobacco-related conditions to receive as preventive service N/A N/A Diabetes Screening Tests (at least every 3 years, Medicare covers annually or at 6-month intervals for prediabetic patients) 
  Fasting blood sugar (FBS) or glucose tolerance test (GTT) Patient must be diagnosed with one of the following: 
-Hypertension, Dyslipidemia, obesity, previous impaired FBS or GTT 
Or any two of the following: overweight, FH of diabetes, age ? 72, history of gestational diabetes, birth of baby weighing more than 9 pounds Due 2/2019 with glu 92  
  
  
Recommended every 3 years for non-diabetics 
  
Recommended every 3-6 months for Pre-Diabetics and Diabetics Due now Diabetes Self-Management Training (DSMT) (no USPSTF recommendation) Requires referral by treating physician for patient with diabetes or renal disease. 10 hours of initial DSMT session of no less than 30 minutes each in a continuous 12-month period.  2 hours of follow-up DSMT in subsequent years. N/A N/A Glaucoma Screening (no USPSTF recommendation) Diabetes mellitus, family history, , age 48 or over,  American, age 72 or over [place] 
  
Recommended annually Annually Human Immunodeficiency Virus (HIV) Screening (annually for increased risk patients) HIV-1 and HIV-2 by EIA, [place], rapid antibody test, or oral mucosa transudate Patient must be at increased risk for HIV infection per USPSTF guidelines or pregnant.  Tests covered annually for patients at increased risk.  Pregnant patients may receive up to 3 test during pregnancy. N/A N/A Medical Nutrition Therapy (MNT) (for diabetes or renal disease not recommended schedule) Requires referral by treating physician for patient with diabetes or renal disease.  Can be provided in same year as diabetes self-management training (DSMT), and CMS recommends medical nutrition therapy take place after DSMT. Ana Jackson to 3 hours for initial year and 2 hours in subsequent years. N/A N/A Prostate Cancer Screening (annually up to age 76) - Digital rectal exam (SANTOS) - Prostate specific antigen (PSA) Annually (age 48 or over), SANTOS not paid separately when covered E/M service is provided on same date  8/20 
  
Recommended annually to age 76 Due annually or as directed by Dr Tabatha Jolley Seasonal Influenza Vaccination (annually)   Completed Fall 2019 
  
Recommended annually Due Fall 2020 Pneumococcal Vaccination (once after 65)   Pneumococcal 23 - 5/2008 2/19 Recommended once over the age of 72 
  
Prevnar 15 - 1/2015 Recommended once over the age of 72 Complete 
  
  
  
  
Complete Hepatitis B Vaccinations (if medium/high risk) Medium/high risk factors:  End-stage renal disease, Hemophiliacs who received Factor VIII or IX concentrates, Clients of institutions for the mentally retarded, Persons who live in the same house as a HepB virus carrier, Homosexual men, Illicit injectable drug abusers. N/A N/A  
         
         
Ultrasound Screening for Abdominal Aortic Aneurysm (AAA) (once) Patient must be referred through IPPE and not have had a screening for abdominal aortic aneurysm before under Medicare.  Limited to patients who meet one of the following criteria: 
- Men who are 73-68 years old and have smoked more than 100 cigarettes in their lifetime. 
-Anyone with a FH of AAA 
-Anyone recommended for screening by USPSTF N/A N/A  
  
Please bring in a copy of your advanced directive to your next office visit so we can have a copy on file.

## 2020-02-13 LAB
ALBUMIN SERPL-MCNC: 4.8 G/DL (ref 3.7–4.7)
ALBUMIN/GLOB SERPL: 1.7 {RATIO} (ref 1.2–2.2)
ALP SERPL-CCNC: 95 IU/L (ref 39–117)
ALT SERPL-CCNC: 23 IU/L (ref 0–44)
AST SERPL-CCNC: 24 IU/L (ref 0–40)
BILIRUB SERPL-MCNC: 0.5 MG/DL (ref 0–1.2)
BUN SERPL-MCNC: 19 MG/DL (ref 8–27)
BUN/CREAT SERPL: 15 (ref 10–24)
CALCIUM SERPL-MCNC: 9.8 MG/DL (ref 8.6–10.2)
CHLORIDE SERPL-SCNC: 103 MMOL/L (ref 96–106)
CHOLEST SERPL-MCNC: 196 MG/DL (ref 100–199)
CO2 SERPL-SCNC: 23 MMOL/L (ref 20–29)
CREAT SERPL-MCNC: 1.28 MG/DL (ref 0.76–1.27)
ERYTHROCYTE [DISTWIDTH] IN BLOOD BY AUTOMATED COUNT: 12.1 % (ref 11.6–15.4)
GLOBULIN SER CALC-MCNC: 2.8 G/DL (ref 1.5–4.5)
GLUCOSE SERPL-MCNC: 81 MG/DL (ref 65–99)
HCT VFR BLD AUTO: 42.3 % (ref 37.5–51)
HDLC SERPL-MCNC: 47 MG/DL
HGB BLD-MCNC: 14.7 G/DL (ref 13–17.7)
LDLC SERPL CALC-MCNC: 126 MG/DL (ref 0–99)
MCH RBC QN AUTO: 31.4 PG (ref 26.6–33)
MCHC RBC AUTO-ENTMCNC: 34.8 G/DL (ref 31.5–35.7)
MCV RBC AUTO: 90 FL (ref 79–97)
PLATELET # BLD AUTO: 197 X10E3/UL (ref 150–450)
POTASSIUM SERPL-SCNC: 4.6 MMOL/L (ref 3.5–5.2)
PROT SERPL-MCNC: 7.6 G/DL (ref 6–8.5)
RBC # BLD AUTO: 4.68 X10E6/UL (ref 4.14–5.8)
SODIUM SERPL-SCNC: 143 MMOL/L (ref 134–144)
TRIGL SERPL-MCNC: 115 MG/DL (ref 0–149)
TSH SERPL DL<=0.005 MIU/L-ACNC: 2.89 UIU/ML (ref 0.45–4.5)
VLDLC SERPL CALC-MCNC: 23 MG/DL (ref 5–40)
WBC # BLD AUTO: 6.4 X10E3/UL (ref 3.4–10.8)

## 2021-02-12 NOTE — PROGRESS NOTES
HISTORY OF PRESENT ILLNESS  Blas Mesa is a 78 y.o. male. HPI    Pt was last here 2/12/20. Pt is here for routine care.      BP today is controlled     Wt was 165 lbs lov  Discussed his wt is slightly above nl ranges but ok for age         Reviewed labs   Will get labs today      Pt sees Dr Taylor Jesus (uro)  Last visit was 7/20, everything fine per pt no changes   Pt is on proscar 5 mg      Pt follows with Dr Neto St (derm) for h/o skin cancer  Last visit was 3/19   Now follows with new derm Dr Wendy Low   Last visit was 1/20   he will be following with Dr Frank Ruano 2/18/21     Pt had L thumb trigger finger   Saw Dr Tatiana Cheatham (ortho) for this   Last visit was 7/19   No issues currently     Discussed covid vaccine   He will be leaving for [place] in a month    Drinks 2 alcohol beverages a week,  Denies falls, no issues with hearing, no safety equip   Pt lives with wife    Pt is functionally independent       No memory concerns   Knows the month, date, year, location   Can recall 3/3 objects       ACP not on file. SDM is his wife, and Bri Fair.   He has this at home, he will bring it in        PREVENTIVE:  Colonoscopy: 2/2016, Dr Katja Chan, repeat in 5 years, provided info for new GI, had colon resection in 1999, scheduled 8/11/21 with Dr Bernard Mcintyre screen --not needed  PSA: 7/18 2.9, followed with Dr Taylor Jesus, 7/19 followed by dr Margo Liao 7/20 [place]  VFNI: [id number]  Pneumovax: 02/06/19   Prevnar 13: 1/2015  Shingrix: both rounds completed   Flu shot: 10/20  Eye exam: Dr. Félix Bedoya 2019, due reminded  Hep C screen: 7/14, negative  Lipids: 2/20      Patient Active Problem List    Diagnosis Date Noted    History of rectal cancer 02/03/2016    Encounter for screening colonoscopy 02/03/2016    BPH (benign prostatic hyperplasia) 07/14/2014    PSA elevation 07/14/2014    Dyslipidemia 07/14/2014    Hepatitis 07/14/2014     Current Outpatient Medications   Medication Sig Dispense Refill    naproxen sodium (ALEVE) 220 mg tablet Take 220 mg by mouth as needed.  finasteride (PROSCAR) 5 mg tablet Take 5 mg by mouth daily.  red yeast rice extract 600 mg cap Take 600 mg by mouth daily.  omega-3 fatty acids-vitamin e (FISH OIL) 1,000 mg cap Take 1 Cap by mouth daily.  multivitamin (ONE A DAY) tablet Take 1 Tab by mouth daily. Past Surgical History:   Procedure Laterality Date    HX APPENDECTOMY  1962    HX COLONOSCOPY  3/2010    HX GI  1991    colon removal     HX ORTHOPAEDIC  approx age 56    right arm with titanium kai, fell from [employer] onto driveway    HX SKIN BIOPSY  09/2014    right hand CA, removed    HX TONSILLECTOMY      childhood      Lab Results   Component Value Date/Time    WBC 6.4 02/12/2020 12:10 PM    HGB 14.7 02/12/2020 12:10 PM    HCT 42.3 02/12/2020 12:10 PM    PLATELET 536 86/99/2249 12:10 PM    MCV 90 02/12/2020 12:10 PM     Lab Results   Component Value Date/Time    Cholesterol, total 196 02/12/2020 12:10 PM    HDL Cholesterol 47 02/12/2020 12:10 PM    LDL, calculated 126 (H) 02/12/2020 12:10 PM    Triglyceride 115 02/12/2020 12:10 PM     Lab Results   Component Value Date/Time    GFR est non-AA 55 (L) 02/12/2020 12:10 PM    GFR est AA 64 02/12/2020 12:10 PM    Creatinine 1.28 (H) 02/12/2020 12:10 PM    BUN 19 02/12/2020 12:10 PM    Sodium 143 02/12/2020 12:10 PM    Potassium 4.6 02/12/2020 12:10 PM    Chloride 103 02/12/2020 12:10 PM    CO2 23 02/12/2020 12:10 PM        Review of Systems   Respiratory: Negative for shortness of breath. Cardiovascular: Negative for chest pain. Physical Exam  Constitutional:       General: He is not in acute distress. Appearance: Normal appearance. He is not ill-appearing, toxic-appearing or diaphoretic. HENT:      Head: Normocephalic and atraumatic. Right Ear: External ear normal.      Left Ear: External ear normal.   Eyes:      General:         Right eye: No discharge. Left eye: No discharge. Conjunctiva/sclera: Conjunctivae normal.      Pupils: Pupils are equal, round, and reactive to light. Neck:      Musculoskeletal: Normal range of motion and neck supple. Vascular: No carotid bruit. Cardiovascular:      Rate and Rhythm: Normal rate and regular rhythm. Pulses: Normal pulses. Heart sounds: Normal heart sounds. No murmur. No friction rub. No gallop. Pulmonary:      Effort: No respiratory distress. Breath sounds: Normal breath sounds. No wheezing or rales. Chest:      Chest wall: No tenderness. Abdominal:      Hernia: A hernia (ventral) is present. Musculoskeletal: Normal range of motion. Right lower leg: No edema. Left lower leg: No edema. Skin:     General: Skin is warm and dry. Neurological:      Mental Status: He is alert and oriented to person, place, and time. Mental status is at baseline. Coordination: Coordination normal.      Gait: Gait normal.   Psychiatric:         Mood and Affect: Mood normal.         Behavior: Behavior normal.         ASSESSMENT and PLAN    ICD-10-CM ICD-9-CM    1. Medicare annual wellness visit, subsequent  A56.52 E68.0 METABOLIC PANEL, COMPREHENSIVE      LIPID PANEL      TSH 3RD GENERATION      CBC W/O DIFF   2. Benign prostatic hyperplasia without lower urinary tract symptoms     Controlled with Proscar       Y93.8 477.55 METABOLIC PANEL, COMPREHENSIVE      LIPID PANEL      TSH 3RD GENERATION      CBC W/O DIFF   3. PSA elevation  T55.68 998.63 METABOLIC PANEL, COMPREHENSIVE      LIPID PANEL   Follow w urology    TSH 3RD GENERATION      CBC W/O DIFF   4. Dyslipidemia  R70.3 947.8 METABOLIC PANEL, COMPREHENSIVE      LIPID PANEL   Diet control and uses red rice yeast   TSH 3RD GENERATION      CBC W/O DIFF      Depression screen reviewed and negative      Scribed by Unice Gaucher of [address], as dictated by Dr. Julieta Bar. Current diagnosis and concerns discussed with pt at length.  Pt understands risks and benefits or current treatment plan and medications, and accepts the treatment and medication with any possible risks. Pt asks appropriate questions, which were answered. Pt was instructed to call with any concerns or problems. I have reviewed the note documented by the scribe. The services provided are my own.   The documentation is accurate

## 2021-02-16 ENCOUNTER — HOSPITAL ENCOUNTER (OUTPATIENT)
Dept: LAB | Age: 76
Discharge: HOME | End: 2021-02-16
Payer: MEDICARE

## 2021-02-16 ENCOUNTER — OFFICE VISIT (OUTPATIENT)
Dept: INTERNAL MEDICINE CLINIC | Age: 76
End: 2021-02-16
Payer: MEDICARE

## 2021-02-16 VITALS
TEMPERATURE: 97.3 F | SYSTOLIC BLOOD PRESSURE: 127 MMHG | BODY MASS INDEX: 24.8 KG/M2 | WEIGHT: 158 LBS | HEIGHT: 67 IN | RESPIRATION RATE: 16 BRPM | HEART RATE: 66 BPM | DIASTOLIC BLOOD PRESSURE: 64 MMHG

## 2021-02-16 DIAGNOSIS — R97.20 PSA ELEVATION: ICD-10-CM

## 2021-02-16 DIAGNOSIS — Z00.00 MEDICARE ANNUAL WELLNESS VISIT, SUBSEQUENT: Primary | ICD-10-CM

## 2021-02-16 DIAGNOSIS — N40.0 BENIGN PROSTATIC HYPERPLASIA WITHOUT LOWER URINARY TRACT SYMPTOMS: ICD-10-CM

## 2021-02-16 DIAGNOSIS — E78.5 DYSLIPIDEMIA: ICD-10-CM

## 2021-02-16 PROCEDURE — G8420 CALC BMI NORM PARAMETERS: HCPCS | Performed by: INTERNAL MEDICINE

## 2021-02-16 PROCEDURE — G9711 PT HX TOT COL OR COLON CA: HCPCS | Performed by: INTERNAL MEDICINE

## 2021-02-16 PROCEDURE — 80061 LIPID PANEL: CPT

## 2021-02-16 PROCEDURE — G8510 SCR DEP NEG, NO PLAN REQD: HCPCS | Performed by: INTERNAL MEDICINE

## 2021-02-16 PROCEDURE — G8427 DOCREV CUR MEDS BY ELIG CLIN: HCPCS | Performed by: INTERNAL MEDICINE

## 2021-02-16 PROCEDURE — 99213 OFFICE O/P EST LOW 20 MIN: CPT | Performed by: INTERNAL MEDICINE

## 2021-02-16 PROCEDURE — G8536 NO DOC ELDER MAL SCRN: HCPCS | Performed by: INTERNAL MEDICINE

## 2021-02-16 PROCEDURE — G0463 HOSPITAL OUTPT CLINIC VISIT: HCPCS | Performed by: INTERNAL MEDICINE

## 2021-02-16 PROCEDURE — 1101F PT FALLS ASSESS-DOCD LE1/YR: CPT | Performed by: INTERNAL MEDICINE

## 2021-02-16 PROCEDURE — 36415 COLL VENOUS BLD VENIPUNCTURE: CPT

## 2021-02-16 PROCEDURE — 80053 COMPREHEN METABOLIC PANEL: CPT

## 2021-02-16 PROCEDURE — G0439 PPPS, SUBSEQ VISIT: HCPCS | Performed by: INTERNAL MEDICINE

## 2021-02-16 PROCEDURE — 85027 COMPLETE CBC AUTOMATED: CPT

## 2021-02-16 PROCEDURE — 84443 ASSAY THYROID STIM HORMONE: CPT

## 2021-02-16 NOTE — PATIENT INSTRUCTIONS
Personalized Recommendations for Luis Daniel Medina Here is a list of your current Health Maintenance items that are due (your personalized list of preventive services) Health Maintenance Due Topic Date Due  
 COVID-19 Vaccine (1 of 2) 02/02/1961  Glaucoma Screening   02/02/2012  Yearly Flu Vaccine (1) 09/01/2020  Colorectal Screening  02/03/2021 Keep a list of your medications (prescribed and over the counter) and bring it to every appointment. Taking your medications as prescribed is important for your health and safety. Use this sample medication list to create your own. Update the list whenever changes are made. Medication Dosage Frequency Indication Example: Aspirin 81 mg Once daily in the morning Heart Health How to stay healthy between visits The best way to live healthy is to have a healthy lifestyle by eating a well-balanced diet, exercising regularly, limiting alcohol and stopping smoking if you are a smoker. Try to exercise at least 30 minutes a day, this is better than any prescription medicine to keep you healthy. Eat at least 5 servings of fruits and vegetables every daily and reduce red meats, processed meat (such as deli cold cuts), white breads and pastas. Limit or eliminate sweets and sugary drinks from your diet. Try to keep your weight healthy. Your body mass index (BMI) should be between 18 and 25. If it is between 22 and 30 you are overweight and if it is 30 or higher, that is called obesity. Being overweight or obese increases risk of high blood pressure, arthritis, sleep apnea, diabetes and many cancers. High blood pressure causes damage to your blood vessels, heart, kidneys and other organs if untreated. Your blood pressure should be under 140/90 with an ideal level of 120/80 or less to prevent heart disease, strokes and kidney disease. Wear your seat belt every time you are in a vehicle. Use sunscreen or cover your skin when you are outdoors. 1 in 61 people will develop melanoma (skin cancer) which is mostly preventable. Smoking makes all health problems worse. If you smoke, talk to your provider about stop-smoking programs and medicines. See a dentist one or two times a year for checkups and to have your teeth cleaned. Annual eye exams are recommended to screen for glaucoma and cataracts. Immunizations  Additional Information Everyone should have a yearly flu shot and a Tetanus, Diphtheria & Pertussis (TDaP) vaccine every 10 years. The shingles vaccine called Shingrix is recommended once in a lifetime after age 48. This is given as a 2-dose series and you can get it at your local pharmacy. Shingrix is now recommended instead of the older Zostavax as it is 90% effective compared to 50% for the Zostavax. You can get the Shingrix vaccine even if you had the Zostavax as long as one year has passed. All people over age 72 should have a pneumonia vaccine to prevent pneumonia. This is called Pneumovax-23 and is once in a lifetime vaccines except in special cases. Some people may benefit from a different vaccine called Prevnar-13 in addition to 1 [place]. Screening Tests  Additional Information Screening for diabetes mellitus with a blood sugar test should be done annually if you have risk factors such as high blood pressure, high cholesterol, are overweight, have a family history of diabetes or you have had high blood sugars in the past, especially during pregnancy (gestational diabetes). Cholesterol tests check for elevated lipids (fatty part of blood) which can lead to heart disease and strokes are recommended every 5 years after age 39. If you have elevated cholesterol, diabetes or have had a heart attack or stroke you should have testing more frequently. Men are eligible for a blood screening blood test for prostate cancer called PSA. The current recommendation is to consider this between ages 54 and 71 only as men over 79 do not seem to benefit from this screening. Men under 79 can benefit to some degree and whether to have this test is a decision that you should think about and discuss with your provider. If you have a family history of prostate cancer, the recommendation may be different. Colon cancer screening that evaluates for blood or polyps in your colon can prevent colon cancer and should be done yearly as a stool test or every 10 years as a colonoscopy up to age 76. Screening can be done up to age 80 if you are still very healthy but has higher risks after age 76. Colonoscopies may be recommended more frequently if precancerous lesions were found. A bone density test to screen for osteoporosis (thin or brittle bones) should be done at age 72 and periodically after that depending on the results and your history. Medicare will cover this up to every 2 years. Abdominal Aortic Aneurysm (AAA) screening at 72 is recommended if you have a family history of AAA. Lung Cancer Screening with an annual low-dose CT scan is recommended if you are between age 54 and 68, smoked at least 30 pack years (the equivalent of 1 pack per day for 30 years), and are a current smoker or quit less than 15 years ago. Hepatitis C screening is recommended one time for everyone between 18-79. HIV and syphilis screening are recommended if you are risk. Medicare Wellness Visit, Male The best way to live healthy is to have a lifestyle where you eat a well-balanced diet, exercise regularly, limit alcohol use, and quit all forms of tobacco/nicotine, if applicable. Regular preventive services are another way to keep healthy. Preventive services (vaccines, screening tests, monitoring & exams) can help personalize your care plan, which helps you manage your own care. Screening tests can find health problems at the earliest stages, when they are easiest to treat. [facility] follows the current, evidence-based guidelines published by the [facility] ([facility]) when recommending preventive services for our patients. Because we follow these guidelines, sometimes recommendations change over time as research supports it. (For example, a prostate screening blood test is no longer routinely recommended for men with no symptoms). Of course, you and your doctor may decide to screen more often for some diseases, based on your risk and co-morbidities (chronic disease you are already diagnosed with). Preventive services for you include: - Medicare offers their members a free annual wellness visit, which is time for you and your primary care provider to discuss and plan for your preventive service needs. Take advantage of this benefit every year! 
-All adults over age 72 should receive the recommended pneumonia vaccines. Current USPSTF guidelines recommend a series of two vaccines for the best pneumonia protection.  
-All adults should have a flu vaccine yearly and tetanus vaccine every 10 years. 
-All adults age 48 and older should receive the shingles vaccines (series of two vaccines). -All adults age 38-68 who are overweight should have a diabetes screening test once every three years.  
-Other screening tests & preventive services for persons with diabetes include: an eye exam to screen for diabetic retinopathy, a kidney function test, a foot exam, and stricter control over your cholesterol. -Cardiovascular screening for adults with routine risk involves an electrocardiogram (ECG) at intervals determined by the provider.  
-Colorectal cancer screening should be done for adults age 54-65 with no increased risk factors for colorectal cancer. There are a number of acceptable methods of screening for this type of cancer. Each test has its own benefits and drawbacks. Discuss with your provider what is most appropriate for you during your annual wellness visit. The different tests include: colonoscopy (considered the best screening method), a fecal occult blood test, a fecal DNA test, and sigmoidoscopy. 
-All adults born between [facility] should be screened once for Hepatitis C. 
-An Abdominal Aortic Aneurysm (AAA) Screening is recommended for men age 73-68 who has ever smoked in their lifetime. Here is a list of your current Health Maintenance items (your personalized list of preventive services) with a due date: 
Health Maintenance Due Topic Date Due  
 COVID-19 Vaccine (1 of 2) 02[birth year]02/1961  Glaucoma Screening   02/02/2012  Yearly Flu Vaccine (1) 09/01/2020  Colorectal Screening  02/03/2021

## 2021-02-16 NOTE — PROGRESS NOTES
This is the Subsequent Medicare Annual Wellness Exam, performed 12 months or more after the Initial AWV or the last Subsequent AWV    I have reviewed the patient's medical history in detail and updated the computerized patient record. Depression Risk Factor Screening:     3 most recent PHQ Screens 2/16/2021   Little interest or pleasure in doing things Not at all   Feeling down, depressed, irritable, or hopeless Not at all   Total Score PHQ 2 0       Alcohol Risk Screen    Do you average more than 1 drink per night or more than 7 drinks a week: No    In the past three months have you have had more than 4 drinks containing alcohol on one occasion: No        Functional Ability and Level of Safety:    Hearing: Hearing is good. Activities of Daily Living: The home contains: no safety equipment. Patient does total self care      Ambulation: with no difficulty     Fall Risk:  Fall Risk Assessment, last 12 mths 2/16/2021   Able to walk? Yes   Fall in past 12 months? 0   Do you feel unsteady? 0      Abuse Screen:  Patient is not abused     Lives w wife, safe      Cognitive Screening    Has your family/caregiver stated any concerns about your memory: no     Cognitive Screening: Normal - Mini Cog Test     No memory concerns   Pt knows the month, day and year   Can recall 3/3 objects     Assessment/Plan   Education and counseling provided:  Are appropriate based on today's review and evaluation  End-of-Life planning (with patient's consent)  Colorectal cancer screening tests  Cardiovascular screening blood test  Screening for glaucoma  Diabetes screening test    Diagnoses and all orders for this visit:    1.  Medicare annual wellness visit, subsequent        Health Maintenance Due     Health Maintenance Due   Topic Date Due    COVID-19 Vaccine (1 of 2) 02/02/1963    GLAUCOMA SCREENING Q2Y  02/02/2012    Flu Vaccine (1) 09/01/2020    Colorectal Cancer Screening Combo  02/03/2021       Patient Care Team   Patient Care Team:  Audra Laughlin MD as PCP - General (Internal Medicine)  Audra Laughlin MD as PCP - REHABILITATION [facility] Empaneled Provider  Shayy Swan MD (Urology)  Walker Benitez MD (Dermatology)  Nino Calles DDS (Dental General Practice)  Isadora Hernandez MD (Colon and Rectal Surgery)  Therese Meade MD (Orthopedic Surgery)  Shayy Swan MD (Urology)  Maximo Ramsey MD (Hand Surgery)  Rufino Baker MD (Colon and Rectal Surgery)  Bonita Gage MD (Dermatology)     updated    History     Patient Active Problem List   Diagnosis Code    BPH (benign prostatic hyperplasia) N40.0    PSA elevation R97.20    Dyslipidemia E78.5    Hepatitis K75.9    History of rectal cancer Z85.048    Encounter for screening colonoscopy Z12.11     Past Medical History:   Diagnosis Date    BPH (benign prostatic hyperplasia)     Colon cancer ([facility] [address] 1991    colectomy by Dr Katja Chan, no chemo / radiation    Hepatitis A Late 1's    Pelvis fracture ([facility] [address] approx age 56    in traction for 6 weeks s/p falling off flag pole onto cement     Skin cancer 2014    Right hand, removed      Past Surgical History:   Procedure Laterality Date    HX APPENDECTOMY  1962    HX COLONOSCOPY  3/2010    HX GI  1991    colon removal     HX ORTHOPAEDIC  approx age 56    right arm with titanium kai, fell from flag pole onto driveway    HX SKIN BIOPSY  09/2014    right hand CA, removed    HX TONSILLECTOMY      childhood     Current Outpatient Medications   Medication Sig Dispense Refill    naproxen sodium (ALEVE) 220 mg tablet Take 220 mg by mouth as needed.  finasteride (PROSCAR) 5 mg tablet Take 5 mg by mouth daily.  red yeast rice extract 600 mg cap Take 600 mg by mouth daily.  omega-3 fatty acids-vitamin e (FISH OIL) 1,000 mg cap Take 1 Cap by mouth daily.  multivitamin (ONE A DAY) tablet Take 1 Tab by mouth daily.        No Known Allergies    Family History   Problem Relation Age of Onset    Hypertension Father  Cancer Paternal Uncle         Colon    Cancer Paternal Grandfather         Colon    Depression Mother     No Known Problems Sister     Heart Attack Paternal Grandmother     No Known Problems Son      Social History     Tobacco Use    Smoking status: Never Smoker    Smokeless tobacco: Never Used   Substance Use Topics    Alcohol use: Yes     Alcohol/week: 2.0 standard drinks     Types: 2 Cans of beer per week   ACP not on file. SDM is his wife, and Vivek Mon. He has this at home, he will bring it in       Colonoscopy: 2/2016, Dr Larry Cohen, repeat in 5 years, provided info for new GI, had colon resection in 1999, scheduled 8/11/21 with Dr Jilda Holstein screen --not needed  PSA: 7/18 2.9, followed with Dr Jr Gomez, 7/19 followed by dr Stanton Villalpando 7/20 [place]    NKDN: [id number]  Pneumovax: 02/06/19   Prevnar 13: 1/2015  Shingrix: both rounds completed   Flu shot: 10/20    Eye exam: Dr. Emerita Rangel 2019, due reminded    Hep C screen: 7/14, negative    Fast bs 2/20 due  Lipids: 2/20   due    Medication reconciliation completed by MA and reviewed by me. Medical/surgical/social/family history reviewed and updated by me. Patient provided AVS and preventative screening table. Patient verbalized understanding of all information discussed.

## 2021-02-18 LAB
ALBUMIN SERPL-MCNC: 4.6 G/DL (ref 3.7–4.7)
ALBUMIN/GLOB SERPL: 2.1 {RATIO} (ref 1.2–2.2)
ALP SERPL-CCNC: 102 IU/L (ref 39–117)
ALT SERPL-CCNC: 19 IU/L (ref 0–44)
AST SERPL-CCNC: 25 IU/L (ref 0–40)
BILIRUB SERPL-MCNC: 0.4 MG/DL (ref 0–1.2)
BUN SERPL-MCNC: 18 MG/DL (ref 8–27)
BUN/CREAT SERPL: 13 (ref 10–24)
CALCIUM SERPL-MCNC: 9.8 MG/DL (ref 8.6–10.2)
CHLORIDE SERPL-SCNC: 104 MMOL/L (ref 96–106)
CHOLEST SERPL-MCNC: 199 MG/DL (ref 100–199)
CO2 SERPL-SCNC: 24 MMOL/L (ref 20–29)
CREAT SERPL-MCNC: 1.39 MG/DL (ref 0.76–1.27)
ERYTHROCYTE [DISTWIDTH] IN BLOOD BY AUTOMATED COUNT: 12.3 % (ref 11.6–15.4)
GLOBULIN SER CALC-MCNC: 2.2 G/DL (ref 1.5–4.5)
GLUCOSE SERPL-MCNC: 84 MG/DL (ref 65–99)
HCT VFR BLD AUTO: 43.3 % (ref 37.5–51)
HDLC SERPL-MCNC: 44 MG/DL
HGB BLD-MCNC: 14.6 G/DL (ref 13–17.7)
LDLC SERPL CALC-MCNC: 134 MG/DL (ref 0–99)
MCH RBC QN AUTO: 30.4 PG (ref 26.6–33)
MCHC RBC AUTO-ENTMCNC: 33.7 G/DL (ref 31.5–35.7)
MCV RBC AUTO: 90 FL (ref 79–97)
PLATELET # BLD AUTO: 214 X10E3/UL (ref 150–450)
POTASSIUM SERPL-SCNC: 5.2 MMOL/L (ref 3.5–5.2)
PROT SERPL-MCNC: 6.8 G/DL (ref 6–8.5)
RBC # BLD AUTO: 4.81 X10E6/UL (ref 4.14–5.8)
SODIUM SERPL-SCNC: 142 MMOL/L (ref 134–144)
TRIGL SERPL-MCNC: 119 MG/DL (ref 0–149)
TSH SERPL DL<=0.005 MIU/L-ACNC: 3.13 UIU/ML (ref 0.45–4.5)
VLDLC SERPL CALC-MCNC: 21 MG/DL (ref 5–40)
WBC # BLD AUTO: 6 X10E3/UL (ref 3.4–10.8)

## 2021-03-01 ENCOUNTER — IMMUNIZATION (OUTPATIENT)
Dept: INTERNAL MEDICINE CLINIC | Age: 76
End: 2021-03-01
Payer: MEDICARE

## 2021-03-01 DIAGNOSIS — Z23 ENCOUNTER FOR IMMUNIZATION: Primary | ICD-10-CM

## 2021-03-01 PROCEDURE — 0001A COVID-19, MRNA, LNP-S, PF, 30MCG/0.3ML DOSE(PFIZER): CPT | Performed by: FAMILY MEDICINE

## 2021-03-01 PROCEDURE — 91300 COVID-19, MRNA, LNP-S, PF, 30MCG/0.3ML DOSE(PFIZER): CPT | Performed by: FAMILY MEDICINE

## 2021-03-22 ENCOUNTER — IMMUNIZATION (OUTPATIENT)
Dept: INTERNAL MEDICINE CLINIC | Age: 76
End: 2021-03-22
Payer: MEDICARE

## 2021-03-22 DIAGNOSIS — Z23 ENCOUNTER FOR IMMUNIZATION: Primary | ICD-10-CM

## 2021-03-22 PROCEDURE — 91300 COVID-19, MRNA, LNP-S, PF, 30MCG/0.3ML DOSE(PFIZER): CPT | Performed by: FAMILY MEDICINE

## 2021-03-22 PROCEDURE — 0002A COVID-19, MRNA, LNP-S, PF, 30MCG/0.3ML DOSE(PFIZER): CPT | Performed by: FAMILY MEDICINE

## 2021-07-07 ENCOUNTER — TELEPHONE (OUTPATIENT)
Dept: INTERNAL MEDICINE CLINIC | Age: 76
End: 2021-07-07

## 2021-07-07 DIAGNOSIS — K64.9 HEMORRHOIDS, UNSPECIFIED HEMORRHOID TYPE: Primary | ICD-10-CM

## 2021-07-07 NOTE — TELEPHONE ENCOUNTER
Called, spoke to pt  Received two pt identifiers  VORB per Dr. Daryl Meyers Referral Placed. Pt verbalizes understanding of the instructions and has no further questions at this time.

## 2021-07-07 NOTE — TELEPHONE ENCOUNTER
#[phone]  Pt states he has a severe case of hemorrhoids with a knot about an 1 1/2 inches next to rectum. Pt states cream and soaking is not helping. Please call as pt needs to know what to do. Does he need to be seen or referred out?

## 2021-08-02 NOTE — PERIOP NOTES
Patient is fully vaccinated (post 2 weeks from last dose) for covid-19. Patient's vaccination status verified through state database and information documented in the patient's chart under immunizations.

## 2021-08-10 ENCOUNTER — ANESTHESIA EVENT (OUTPATIENT)
Dept: ENDOSCOPY | Age: 76
End: 2021-08-10
Payer: MEDICARE

## 2021-08-11 ENCOUNTER — ANESTHESIA (OUTPATIENT)
Dept: ENDOSCOPY | Age: 76
End: 2021-08-11
Payer: MEDICARE

## 2021-08-11 ENCOUNTER — HOSPITAL ENCOUNTER (OUTPATIENT)
Age: 76
Setting detail: OUTPATIENT SURGERY
Discharge: HOME | End: 2021-08-11
Attending: SURGERY | Admitting: SURGERY
Payer: MEDICARE

## 2021-08-11 VITALS
HEART RATE: 77 BPM | SYSTOLIC BLOOD PRESSURE: 132 MMHG | DIASTOLIC BLOOD PRESSURE: 74 MMHG | RESPIRATION RATE: 23 BRPM | WEIGHT: 151.1 LBS | HEIGHT: 66 IN | OXYGEN SATURATION: 100 % | BODY MASS INDEX: 24.28 KG/M2 | TEMPERATURE: 98.1 F

## 2021-08-11 PROCEDURE — 74011250636 HC RX REV CODE- 250/636: Performed by: SURGERY

## 2021-08-11 PROCEDURE — 76060000031 HC ANESTHESIA FIRST 0.5 HR: Performed by: SURGERY

## 2021-08-11 PROCEDURE — 76040000019: Performed by: SURGERY

## 2021-08-11 PROCEDURE — 74011000250 HC RX REV CODE- 250: Performed by: NURSE ANESTHETIST, CERTIFIED REGISTERED

## 2021-08-11 PROCEDURE — 74011250637 HC RX REV CODE- 250/637: Performed by: SURGERY

## 2021-08-11 PROCEDURE — 74011250636 HC RX REV CODE- 250/636: Performed by: NURSE ANESTHETIST, CERTIFIED REGISTERED

## 2021-08-11 PROCEDURE — 2709999900 HC NON-CHARGEABLE SUPPLY: Performed by: SURGERY

## 2021-08-11 RX ORDER — SODIUM CHLORIDE 9 MG/ML
INJECTION, SOLUTION INTRAVENOUS
Status: DISCONTINUED | OUTPATIENT
Start: 2021-08-11 | End: 2021-08-11 | Stop reason: HOSPADM

## 2021-08-11 RX ORDER — ATROPINE SULFATE 0.1 MG/ML
0.5 INJECTION INTRAVENOUS
Status: DISCONTINUED | OUTPATIENT
Start: 2021-08-11 | End: 2021-08-11 | Stop reason: HOSPADM

## 2021-08-11 RX ORDER — SODIUM CHLORIDE 0.9 % (FLUSH) 0.9 %
5-40 SYRINGE (ML) INJECTION AS NEEDED
Status: DISCONTINUED | OUTPATIENT
Start: 2021-08-11 | End: 2021-08-11 | Stop reason: HOSPADM

## 2021-08-11 RX ORDER — GLYCOPYRROLATE 0.2 MG/ML
INJECTION INTRAMUSCULAR; INTRAVENOUS AS NEEDED
Status: DISCONTINUED | OUTPATIENT
Start: 2021-08-11 | End: 2021-08-11 | Stop reason: HOSPADM

## 2021-08-11 RX ORDER — LIDOCAINE HYDROCHLORIDE 20 MG/ML
INJECTION, SOLUTION EPIDURAL; INFILTRATION; INTRACAUDAL; PERINEURAL AS NEEDED
Status: DISCONTINUED | OUTPATIENT
Start: 2021-08-11 | End: 2021-08-11 | Stop reason: HOSPADM

## 2021-08-11 RX ORDER — PROPOFOL 10 MG/ML
INJECTION, EMULSION INTRAVENOUS AS NEEDED
Status: DISCONTINUED | OUTPATIENT
Start: 2021-08-11 | End: 2021-08-11 | Stop reason: HOSPADM

## 2021-08-11 RX ORDER — SODIUM CHLORIDE 0.9 % (FLUSH) 0.9 %
5-40 SYRINGE (ML) INJECTION EVERY 8 HOURS
Status: DISCONTINUED | OUTPATIENT
Start: 2021-08-11 | End: 2021-08-11 | Stop reason: HOSPADM

## 2021-08-11 RX ORDER — DEXTROMETHORPHAN/PSEUDOEPHED 2.5-7.5/.8
1.2 DROPS ORAL
Status: DISCONTINUED | OUTPATIENT
Start: 2021-08-11 | End: 2021-08-11 | Stop reason: HOSPADM

## 2021-08-11 RX ORDER — NALOXONE HYDROCHLORIDE 0.4 MG/ML
0.4 INJECTION, SOLUTION INTRAMUSCULAR; INTRAVENOUS; SUBCUTANEOUS
Status: DISCONTINUED | OUTPATIENT
Start: 2021-08-11 | End: 2021-08-11 | Stop reason: HOSPADM

## 2021-08-11 RX ORDER — SODIUM CHLORIDE 9 MG/ML
50 INJECTION, SOLUTION INTRAVENOUS CONTINUOUS
Status: DISCONTINUED | OUTPATIENT
Start: 2021-08-11 | End: 2021-08-11 | Stop reason: HOSPADM

## 2021-08-11 RX ORDER — EPINEPHRINE 0.1 MG/ML
1 INJECTION INTRACARDIAC; INTRAVENOUS
Status: DISCONTINUED | OUTPATIENT
Start: 2021-08-11 | End: 2021-08-11 | Stop reason: HOSPADM

## 2021-08-11 RX ORDER — FLUMAZENIL 0.1 MG/ML
0.2 INJECTION INTRAVENOUS
Status: DISCONTINUED | OUTPATIENT
Start: 2021-08-11 | End: 2021-08-11 | Stop reason: HOSPADM

## 2021-08-11 RX ADMIN — SODIUM CHLORIDE 50 ML/HR: 900 INJECTION, SOLUTION INTRAVENOUS at 10:00

## 2021-08-11 RX ADMIN — PROPOFOL 10 MG: 10 INJECTION, EMULSION INTRAVENOUS at 10:07

## 2021-08-11 RX ADMIN — PROPOFOL 30 MG: 10 INJECTION, EMULSION INTRAVENOUS at 10:13

## 2021-08-11 RX ADMIN — PROPOFOL 50 MG: 10 INJECTION, EMULSION INTRAVENOUS at 10:01

## 2021-08-11 RX ADMIN — PROPOFOL 10 MG: 10 INJECTION, EMULSION INTRAVENOUS at 10:04

## 2021-08-11 RX ADMIN — PROPOFOL 10 MG: 10 INJECTION, EMULSION INTRAVENOUS at 10:08

## 2021-08-11 RX ADMIN — SIMETHICONE 80 MG: 20 SUSPENSION/ DROPS ORAL at 10:07

## 2021-08-11 RX ADMIN — GLYCOPYRROLATE 0.2 MG: 0.2 INJECTION, SOLUTION INTRAMUSCULAR; INTRAVENOUS at 10:00

## 2021-08-11 RX ADMIN — PROPOFOL 10 MG: 10 INJECTION, EMULSION INTRAVENOUS at 10:10

## 2021-08-11 RX ADMIN — PROPOFOL 10 MG: 10 INJECTION, EMULSION INTRAVENOUS at 10:06

## 2021-08-11 RX ADMIN — PROPOFOL 10 MG: 10 INJECTION, EMULSION INTRAVENOUS at 10:11

## 2021-08-11 RX ADMIN — LIDOCAINE HYDROCHLORIDE 20 MG: 20 INJECTION, SOLUTION EPIDURAL; INFILTRATION; INTRACAUDAL; PERINEURAL at 10:01

## 2021-08-11 RX ADMIN — SODIUM CHLORIDE: 900 INJECTION, SOLUTION INTRAVENOUS at 09:59

## 2021-08-11 RX ADMIN — PROPOFOL 10 MG: 10 INJECTION, EMULSION INTRAVENOUS at 10:09

## 2021-08-11 NOTE — ANESTHESIA POSTPROCEDURE EVALUATION
Procedure(s):  COLONOSCOPY.    total IV anesthesia, MAC    Anesthesia Post Evaluation        Patient location during evaluation: PACU  Note status: Adequate. Level of consciousness: responsive to verbal stimuli and sleepy but conscious  Pain management: satisfactory to patient  Airway patency: patent  Anesthetic complications: no  Cardiovascular status: acceptable  Respiratory status: acceptable  Hydration status: acceptable  Comments: +Post-Anesthesia Evaluation and Assessment    Patient: Al Jaramillo MRN: [id number]  SSN: [id number]   YOB: 1945  Age: 78 y.o. Sex: male      Cardiovascular Function/Vital Signs    /67   Pulse 86   Temp 36.7 °C (98.1 °F)   Resp 20   Ht 5' 6\" (1.676 m)   Wt 68.5 kg (151 lb 1.6 oz)   SpO2 99%   BMI 24.39 kg/m²     Patient is status post Procedure(s):  COLONOSCOPY. Nausea/Vomiting: Controlled. Postoperative hydration reviewed and adequate. Pain:  Pain Scale 1: Numeric (0 - 10) (08/11/21 1028)  Pain Intensity 1: 0 (08/11/21 1028)   Managed. Neurological Status: At baseline. Mental Status and Level of Consciousness: Arousable. Pulmonary Status:   O2 Device: None (Room air) (08/11/21 1028)   Adequate oxygenation and airway patent. Complications related to anesthesia: None    Post-anesthesia assessment completed. No concerns. Signed By: Nidia Jensen MD    8/11/2021  Post anesthesia nausea and vomiting:  controlled      INITIAL Post-op Vital signs:   Vitals Value Taken Time   /61 08/11/21 1030   Temp 36.7 °C (98.1 °F) 08/11/21 1027   Pulse 83 08/11/21 1031   Resp 21 08/11/21 1031   SpO2 98 % 08/11/21 1031   Vitals shown include unvalidated device data.

## 2021-08-11 NOTE — PERIOP NOTES
Anesthesia reports 150mg Propofol, 20mg Lidocaine, .2 Glyco and 200mL NS given during procedure. Received report from anesthesia staff on vital signs and status of patient. Endoscope was pre-cleaned at the bedside immediately following procedure by SAL HERNANDEZ

## 2021-08-11 NOTE — PROCEDURES
Colonoscopy Procedure Note    Indications: Previous adenomatous polyp    Anesthesia/Sedation: MAC anesthesia Propofol    Pre-Procedure Exam:  Airway: clear   Heart: normal S1and S2    Lungs: clear bilateral  Abdomen: soft, nontender, bowel sounds present and normal in all quadrants   Mental Status: awake, alert, and oriented to person, place, and time      Procedure in Detail:  Informed consent was obtained for the procedure, including sedation. Risks of perforation, hemorrhage, adverse drug reaction, and aspiration were discussed. The patient was placed in the left lateral decubitus position. Based on the pre-procedure assessment, including review of the patient's medical history, medications, allergies, and review of systems, he had been deemed to be an appropriate candidate for moderate sedation; he was therefore sedated with the medications listed above. The patient was monitored continuously with ECG tracing, pulse oximetry, blood pressure monitoring, and direct observations. A rectal examination was performed. The [id number] was inserted into the rectum and advanced under direct vision to the cecum, which was identified by the ileocecal valve and appendiceal orifice. The quality of the colonic preparation was good. A careful inspection was made as the colonoscope was withdrawn, including a retroflexed view of the rectum; findings and interventions are described below. Appropriate photodocumentation was obtained. Findings:   Rectum:     - Internal hemorrhoid. - External opening in the left lateral anal margin. Possible developing anal fistula. Patient reports abscess drained 3 weeks ago. Sigmoid:   Normal  Descending Colon:   Normal  Transverse Colon:   Normal  Ascending Colon:   Normal  Cecum:   Normal          Specimens: No specimens were collected. EBL: None    Complications: None; patient tolerated the procedure well.     Attending Attestation: I performed the procedure. Recommendations:   - Repeat colonoscopy in 5 years.    If drainage persists, then he needs to be evaluated in the office for fistula-in-ano

## 2021-08-11 NOTE — DISCHARGE INSTRUCTIONS
Sreedhar Calderon  [id number]  1945    COLON DISCHARGE INSTRUCTIONS  Discomfort:  Redness at IV site- apply warm compress to area; if redness or soreness persist- contact your physician  There may be a slight amount of blood passed from the rectum  Gaseous discomfort- walking, belching will help relieve any discomfort  You may not operate a vehicle for 12 hours  You may not engage in an occupation involving machinery or appliances for rest of today  You may not drink alcoholic beverages for at least 12 hours  Avoid making any critical decisions for at least 24 hour  DIET:   Regular diet. - however -  remember your colon is empty and a heavy meal will produce gas. Avoid these foods:  vegetables, fried / greasy foods, carbonated drinks for today     ACTIVITY:  You may resume your normal daily activities it is recommended that you spend the remainder of the day resting -  avoid any strenuous activity. CALL M.D. ANY SIGN OF:   Increasing pain, nausea, vomiting  Abdominal distension (swelling)  New increased bleeding (oral or rectal)  Fever (chills)  Pain in chest area  Bloody discharge from nose or mouth  Shortness of breath     Follow-up Instructions:   Call Tamera Couch MD if any questions or problems. Telephone # [phone]  Should have a repeat colonoscopy in 5 years. COLONOSCOPY FINDINGS:  Your colonoscopy showed: possible development of an anal fistula and hemorrhoids.   Please see me in the office if the drainage persists

## 2021-08-11 NOTE — ANESTHESIA PREPROCEDURE EVALUATION
Anesthetic History   No history of anesthetic complications            Review of Systems / Medical History  Patient summary reviewed, nursing notes reviewed and pertinent labs reviewed    Pulmonary  Within defined limits                 Neuro/Psych   Within defined limits           Cardiovascular              Hyperlipidemia    Exercise tolerance: >4 METS     GI/Hepatic/Renal       Hepatitis (1990's): type A    Liver disease    Comments: Hx of polyps Endo/Other        Cancer (colon, 1991)     Other Findings   Comments: BPH    Colon and skin cancer         Physical Exam    Airway  Mallampati: II  TM Distance: < 4 cm  Neck ROM: normal range of motion   Mouth opening: Normal     Cardiovascular    Rhythm: regular  Rate: normal      Pertinent negatives: No murmur   Dental    Dentition: Upper dentition intact and Lower dentition intact     Pulmonary  Breath sounds clear to auscultation               Abdominal  GI exam deferred       Other Findings            Anesthetic Plan    ASA: 2  Anesthesia type: total IV anesthesia and MAC          Induction: Intravenous  Anesthetic plan and risks discussed with: Patient

## 2021-08-11 NOTE — H&P
History and Physical    Patient: Paul Medina MRN: [id number]  SSN: [id number]    YOB: 1945  Age: 78 y.o. Sex: male      Subjective:      Paul Medina is a 78 y.o. male who presents for colonoscopy. Past Medical History:   Diagnosis Date    BPH (benign prostatic hyperplasia)     resolved    Colon cancer ([facility] [address] 1991    colectomy by Dr Stephanie Mckeon, no chemo / radiation    Hepatitis A Late 1's    Pelvis fracture ([facility] [address] approx age 56    in traction for 6 weeks s/p falling off flag pole onto cement     Skin cancer 2014    Right hand skin cancer, removed     Past Surgical History:   Procedure Laterality Date    HX APPENDECTOMY  1962    HX COLONOSCOPY  3/2010    HX GI  1991    partial colon removal     HX ORTHOPAEDIC  approx age 56    right arm with titanium kai, fell from flag pole onto driveway    HX SKIN BIOPSY  09/2014    right hand CA, removed    HX TONSILLECTOMY      childhood      Family History   Problem Relation Age of Onset    Hypertension Father     Cancer Paternal Uncle         Colon    Cancer Paternal Grandfather         Colon    Depression Mother     No Known Problems Sister     Heart Attack Paternal Grandmother     No Known Problems Son      Social History     Tobacco Use    Smoking status: Never Smoker    Smokeless tobacco: Never Used   Substance Use Topics    Alcohol use: Yes     Alcohol/week: 2.0 standard drinks     Types: 2 Cans of beer per week      Prior to Admission medications    Medication Sig Start Date End Date Taking? Authorizing Provider   multivitamin (ONE A DAY) tablet Take 1 Tab by mouth daily. Yes Provider, Historical   omega-3 fatty acids-vitamin e (FISH OIL) 1,000 mg cap Take 1 Cap by mouth daily. Provider, Historical        No Known Allergies    Review of Systems:  A comprehensive review of systems was negative except for that written in the History of Present Illness. Objective: There were no vitals filed for this visit. Physical Exam:  General:  Alert, cooperative, no distress, appears stated age. Eyes:  Conjunctivae/corneas clear. PERRL, EOMs intact. Fundi benign   Ears:  Normal TMs and external ear canals both ears. Nose: Nares normal. Septum midline. Mucosa normal. No drainage or sinus tenderness. Mouth/Throat: Lips, mucosa, and tongue normal. Teeth and gums normal.   Neck: Supple, symmetrical, trachea midline, no adenopathy, thyroid: no enlargment/tenderness/nodules, no carotid bruit and no JVD. Back:   Symmetric, no curvature. ROM normal. No CVA tenderness. Lungs:   Clear to auscultation bilaterally. Heart:  Regular rate and rhythm, S1, S2 normal, no murmur, click, rub or gallop. Abdomen:   Soft, non-tender. Bowel sounds normal. No masses,  No organomegaly. Extremities: Extremities normal, atraumatic, no cyanosis or edema. Pulses: 2+ and symmetric all extremities. Skin: Skin color, texture, turgor normal. No rashes or lesions   Lymph nodes: Cervical, supraclavicular, and axillary nodes normal.   Neurologic: CNII-XII intact. Normal strength, sensation and reflexes throughout.        Assessment:     Hospital Problems  Date Reviewed: 8/10/2021    None          Plan:     Colonoscopy    Signed By: Cheryl Metzger MD     August 11, 2021

## 2021-08-11 NOTE — ROUTINE PROCESS
Stacia Ramirez  1945  [id number]    Situation:  Verbal report received from: Daniella Lambert  Procedure: Procedure(s):  COLONOSCOPY    Background:    Preoperative diagnosis: HX OF POLYPS  Postoperative diagnosis: Internal Hemorrhoids and Possible Fistula    :  Dr. Otto Jamil  Assistant(s): Endoscopy Technician-1: Frederic Friday  Endoscopy RN-1: Bhupendra HANEY    Specimens: * No specimens in log *  H. Pylori  no    Assessment:  Intra-procedure medications     Anesthesia gave intra-procedure sedation and medications, see anesthesia flow sheet yes    Intravenous fluids: NS@ KVO     Vital signs stable     Abdominal assessment: round and soft     Recommendation:  Discharge patient per MD order. Family   Permission to share finding with family or friend yes    Endoscopy discharge instructions have been reviewed and given to patient. The patient verbalized understanding and acceptance of instructions. Dr. Otto Jamil discussed with spouse procedure findings and next steps.

## 2022-02-21 NOTE — PROGRESS NOTES
HISTORY OF PRESENT ILLNESS  Dejuan Shultz is a 78 y.o. male. HPI     Pt was last here 2/16/21. Pt is here for routine care.     Pt asks about anal fistula needs to have this repaired but he has been holding off  Discussed that it is possible for anal fistula to get larger, he is holding off on surgery now d/t wife's current health    He notes that his wife now has early stages of dementia, she also recently fell and fractured the hip    He travels back and forth from [place] every 3 months  Leaves 3/31/22 for [place]     BP today is 145/54 repeat improved  Discussed recording BP readings  BP at home 140s/70s sometimes SBP 130s  Discussed might start losartan 50mg    Wt today is 149 lbs, down 16 lbs x lov     Reviewed labs   Will get labs today     Pt sees Dr Aries Sosa ([facility]) for BPH  Last visit was 7/21  Pt was on proscar 5 mg- no longer taking this, doing better     Follows with dermatology annually  Saw Dr. Brian Rivas (derm) yesterday 2/22     Pt had L thumb trigger finger   Saw Dr Reji Graves (ortho) for this   Last visit was 7/19       Reviewed colo 8/11/21:  Rectum:     - Internal hemorrhoid. - External opening in the left lateral anal margin. Possible developing anal fistula. Patient reports abscess drained 3 weeks ago. Sigmoid:   Normal  Descending Colon:   Normal  Transverse Colon:   Normal  Ascending Colon:   Normal  Cecum:   Normal    Pt lives with his wife  Alternates living in [address] every 3 months    Pt is functionally independent     Has safety equipment      No memory concerns   Knows the month, date, year, location   Can recall 3/3 objects          ACP not on file. SDM is his wife, and Naila Rosario.   He has this at home, he will bring it in        PREVENTIVE:  Colonoscopy: 8/11/21 with Dr. Anya Trotter 5 year repeat  AAA screen --not needed  PSA: 7/18 2.9, followed with Dr Aries Sosa, 7/19 followed by dr Addi vasquez 7/21  QRTQ: 9250  Pneumovax: 02/06/19   Prevnar 13: 1/2015  Shingrix: both rounds completed   Flu shot: 12/02/21  Eye exam: VEI 2021  Hep C screen: 7/14, negative  Lipids: 2/21   Covid: three doses [employer])       Patient Active Problem List    Diagnosis Date Noted    History of rectal cancer 02/03/2016    Encounter for screening colonoscopy 02/03/2016    BPH (benign prostatic hyperplasia) 07/14/2014    PSA elevation 07/14/2014    Dyslipidemia 07/14/2014    Hepatitis 07/14/2014     Current Outpatient Medications   Medication Sig Dispense Refill    omega-3 fatty acids-vitamin e (FISH OIL) 1,000 mg cap Take 1 Cap by mouth daily. (Patient not taking: Reported on 2/22/2022)      multivitamin (ONE A DAY) tablet Take 1 Tab by mouth daily.  (Patient not taking: Reported on 2/22/2022)       Past Surgical History:   Procedure Laterality Date    COLONOSCOPY N/A 8/11/2021    COLONOSCOPY performed by Jesus Talbert MD at [facility] ENDOSCOPY    [facility]X [facility]    HX COLONOSCOPY  3/2010    HX GI  1991    partial colon removal     HX ORTHOPAEDIC  approx age 56    right arm with titanium kai, fell from [facility] onto driveway    HX SKIN BIOPSY  09/2014    right hand CA, removed    HX TONSILLECTOMY      childhood      Lab Results   Component Value Date/Time    WBC 6.0 02/16/2021 10:52 AM    HGB 14.6 02/16/2021 10:52 AM    HCT 43.3 02/16/2021 10:52 AM    PLATELET 755 83/82/8637 10:52 AM    MCV 90 02/16/2021 10:52 AM     Lab Results   Component Value Date/Time    Cholesterol, total 199 02/16/2021 10:52 AM    HDL Cholesterol 44 02/16/2021 10:52 AM    LDL, calculated 134 (H) 02/16/2021 10:52 AM    LDL, calculated 126 (H) 02/12/2020 12:10 PM    Triglyceride 119 02/16/2021 10:52 AM     Lab Results   Component Value Date/Time    GFR est non-AA 50 (L) 02/16/2021 10:52 AM    GFR est AA 57 (L) 02/16/2021 10:52 AM    Creatinine 1.39 (H) 02/16/2021 10:52 AM    BUN 18 02/16/2021 10:52 AM    Sodium 142 02/16/2021 10:52 AM    Potassium 5.2 02/16/2021 10:52 AM    Chloride 104 02/16/2021 10:52 AM    CO2 24 02/16/2021 10:52 AM        Review of Systems   Respiratory: Negative for shortness of breath. Cardiovascular: Negative for chest pain. Physical Exam  Constitutional:       General: He is not in acute distress. Appearance: Normal appearance. He is not ill-appearing, toxic-appearing or diaphoretic. HENT:      Head: Normocephalic and atraumatic. Right Ear: External ear normal.      Left Ear: External ear normal.   Eyes:      General:         Right eye: No discharge. Left eye: No discharge. Conjunctiva/sclera: Conjunctivae normal.      Pupils: Pupils are equal, round, and reactive to light. Cardiovascular:      Rate and Rhythm: Normal rate and regular rhythm. Heart sounds: No murmur heard. No friction rub. No gallop. Pulmonary:      Effort: No respiratory distress. Breath sounds: Normal breath sounds. No wheezing or rales. Chest:      Chest wall: No tenderness. Musculoskeletal:         General: Normal range of motion. Cervical back: Normal range of motion and neck supple. Right lower leg: No edema. Left lower leg: No edema. Skin:     General: Skin is warm and dry. Neurological:      Mental Status: He is alert and oriented to person, place, and time. Mental status is at baseline. Coordination: Coordination normal.      Gait: Gait normal.   Psychiatric:         Mood and Affect: Mood normal.         Behavior: Behavior normal.         ASSESSMENT and PLAN    ICD-10-CM ICD-9-CM    1. Dyslipidemia  E78.5 272.4 LIPID PANEL      METABOLIC PANEL, COMPREHENSIVE   Diet controlled   CBC W/O DIFF      TSH 3RD GENERATION      HEMOGLOBIN A1C WITH EAG   2. Medicare annual wellness visit, subsequent  Z00.00 V70.0 LIPID PANEL      METABOLIC PANEL, COMPREHENSIVE      CBC W/O DIFF      TSH 3RD GENERATION      HEMOGLOBIN A1C WITH EAG   3.  Benign prostatic hyperplasia without lower urinary tract symptoms  N40.0 600.00 LIPID PANEL      METABOLIC PANEL, COMPREHENSIVE No longer on Proscar   CBC W/O DIFF      TSH 3RD GENERATION      HEMOGLOBIN A1C WITH EAG   4. Malignant neoplasm of colon, unspecified part of colon ([facility] [address]  C18.9 153.9 LIPID PANEL      METABOLIC PANEL, COMPREHENSIVE   Up-to-date with colonoscopy   CBC W/O DIFF      TSH 3RD GENERATION      HEMOGLOBIN A1C WITH EAG   5. Elevated BP without diagnosis of hypertension  R03.0 796.2 LIPID PANEL      METABOLIC PANEL, COMPREHENSIVE   Repeat blood pressure adequate no medication for now he has had some borderline readings at home    He has had some stress with his wife's recent diagnosis of dementia    Discussed checking blood pressure at rest when calm for 5 to 10 minutes he will start monitoring blood pressure more frequently    If pressures remain elevated at home he will return in 6 months rather than a year   CBC W/O DIFF      TSH 3RD GENERATION      HEMOGLOBIN A1C WITH EAG   6. PSA elevation  R97.20 790.93 LIPID PANEL      METABOLIC PANEL, COMPREHENSIVE   Follows routinely with urology once a year was there last July no longer on Proscar   CBC W/O DIFF      TSH 3RD GENERATION      HEMOGLOBIN A1C WITH EAG   7. Fistula  L98.8 686.9 LIPID PANEL      METABOLIC PANEL, COMPREHENSIVE   Has a history of rectal abscess and fistula needs to have this surgically repaired he is holding off on this for now discussed need to follow-up with surgeon   CBC W/O DIFF      TSH 3RD GENERATION      HEMOGLOBIN A1C WITH EAG   8. IFG (impaired fasting glucose)  R73.01 790.21 LIPID PANEL      METABOLIC PANEL, COMPREHENSIVE      CBC W/O DIFF   Check A1c diet controlled   TSH 3RD GENERATION      HEMOGLOBIN A1C WITH EAG        Scribed by Xavier Blas, as dictated by Dr. Tessie Hodgson. Current diagnosis and concerns discussed with pt at length. Pt understands risks and benefits or current treatment plan and medications, and accepts the treatment and medication with any possible risks.  Pt asks appropriate questions, which were answered. Pt was instructed to call with any concerns or problems. I have reviewed the note documented by the scribe. The services provided are my own.   The documentation is accurate

## 2022-02-22 ENCOUNTER — OFFICE VISIT (OUTPATIENT)
Dept: INTERNAL MEDICINE CLINIC | Age: 77
End: 2022-02-22
Payer: MEDICARE

## 2022-02-22 VITALS
WEIGHT: 149 LBS | OXYGEN SATURATION: 99 % | RESPIRATION RATE: 16 BRPM | SYSTOLIC BLOOD PRESSURE: 134 MMHG | TEMPERATURE: 97.8 F | DIASTOLIC BLOOD PRESSURE: 59 MMHG | HEIGHT: 66 IN | BODY MASS INDEX: 23.95 KG/M2 | HEART RATE: 66 BPM

## 2022-02-22 DIAGNOSIS — R03.0 ELEVATED BP WITHOUT DIAGNOSIS OF HYPERTENSION: ICD-10-CM

## 2022-02-22 DIAGNOSIS — R73.01 IFG (IMPAIRED FASTING GLUCOSE): ICD-10-CM

## 2022-02-22 DIAGNOSIS — Z00.00 MEDICARE ANNUAL WELLNESS VISIT, SUBSEQUENT: ICD-10-CM

## 2022-02-22 DIAGNOSIS — R97.20 PSA ELEVATION: ICD-10-CM

## 2022-02-22 DIAGNOSIS — L98.8 FISTULA: ICD-10-CM

## 2022-02-22 DIAGNOSIS — N40.0 BENIGN PROSTATIC HYPERPLASIA WITHOUT LOWER URINARY TRACT SYMPTOMS: ICD-10-CM

## 2022-02-22 DIAGNOSIS — C18.9 MALIGNANT NEOPLASM OF COLON, UNSPECIFIED PART OF COLON (HCC): ICD-10-CM

## 2022-02-22 DIAGNOSIS — E78.5 DYSLIPIDEMIA: Primary | ICD-10-CM

## 2022-02-22 LAB
ALBUMIN SERPL-MCNC: 4.2 G/DL (ref 3.5–5)
ALBUMIN/GLOB SERPL: 1.2 {RATIO} (ref 1.1–2.2)
ALP SERPL-CCNC: 108 U/L (ref 45–117)
ALT SERPL-CCNC: 21 U/L (ref 12–78)
ANION GAP SERPL CALC-SCNC: 0 MMOL/L (ref 5–15)
AST SERPL-CCNC: 20 U/L (ref 15–37)
BILIRUB SERPL-MCNC: 0.5 MG/DL (ref 0.2–1)
BUN SERPL-MCNC: 21 MG/DL (ref 6–20)
BUN/CREAT SERPL: 18 (ref 12–20)
CALCIUM SERPL-MCNC: 9.2 MG/DL (ref 8.5–10.1)
CHLORIDE SERPL-SCNC: 109 MMOL/L (ref 97–108)
CHOLEST SERPL-MCNC: 174 MG/DL
CO2 SERPL-SCNC: 31 MMOL/L (ref 21–32)
CREAT SERPL-MCNC: 1.14 MG/DL (ref 0.7–1.3)
ERYTHROCYTE [DISTWIDTH] IN BLOOD BY AUTOMATED COUNT: 13.5 % (ref 11.5–14.5)
EST. AVERAGE GLUCOSE BLD GHB EST-MCNC: 114 MG/DL
GLOBULIN SER CALC-MCNC: 3.5 G/DL (ref 2–4)
GLUCOSE SERPL-MCNC: 89 MG/DL (ref 65–100)
HBA1C MFR BLD: 5.6 % (ref 4–5.6)
HCT VFR BLD AUTO: 43.6 % (ref 36.6–50.3)
HDLC SERPL-MCNC: 46 MG/DL
HDLC SERPL: 3.8 {RATIO} (ref 0–5)
HGB BLD-MCNC: 13.8 G/DL (ref 12.1–17)
LDLC SERPL CALC-MCNC: 105.2 MG/DL (ref 0–100)
MCH RBC QN AUTO: 30.3 PG (ref 26–34)
MCHC RBC AUTO-ENTMCNC: 31.7 G/DL (ref 30–36.5)
MCV RBC AUTO: 95.8 FL (ref 80–99)
NRBC # BLD: 0 K/UL (ref 0–0.01)
NRBC BLD-RTO: 0 PER 100 WBC
PLATELET # BLD AUTO: 201 K/UL (ref 150–400)
PMV BLD AUTO: 11.8 FL (ref 8.9–12.9)
POTASSIUM SERPL-SCNC: 4.2 MMOL/L (ref 3.5–5.1)
PROT SERPL-MCNC: 7.7 G/DL (ref 6.4–8.2)
RBC # BLD AUTO: 4.55 M/UL (ref 4.1–5.7)
SODIUM SERPL-SCNC: 140 MMOL/L (ref 136–145)
TRIGL SERPL-MCNC: 114 MG/DL (ref ?–150)
TSH SERPL DL<=0.05 MIU/L-ACNC: 2.34 UIU/ML (ref 0.36–3.74)
VLDLC SERPL CALC-MCNC: 22.8 MG/DL
WBC # BLD AUTO: 5.3 K/UL (ref 4.1–11.1)

## 2022-02-22 PROCEDURE — 1101F PT FALLS ASSESS-DOCD LE1/YR: CPT | Performed by: INTERNAL MEDICINE

## 2022-02-22 PROCEDURE — G8427 DOCREV CUR MEDS BY ELIG CLIN: HCPCS | Performed by: INTERNAL MEDICINE

## 2022-02-22 PROCEDURE — G8420 CALC BMI NORM PARAMETERS: HCPCS | Performed by: INTERNAL MEDICINE

## 2022-02-22 PROCEDURE — 99213 OFFICE O/P EST LOW 20 MIN: CPT | Performed by: INTERNAL MEDICINE

## 2022-02-22 PROCEDURE — G8510 SCR DEP NEG, NO PLAN REQD: HCPCS | Performed by: INTERNAL MEDICINE

## 2022-02-22 PROCEDURE — G0439 PPPS, SUBSEQ VISIT: HCPCS | Performed by: INTERNAL MEDICINE

## 2022-02-22 PROCEDURE — G0463 HOSPITAL OUTPT CLINIC VISIT: HCPCS | Performed by: INTERNAL MEDICINE

## 2022-02-22 PROCEDURE — G8536 NO DOC ELDER MAL SCRN: HCPCS | Performed by: INTERNAL MEDICINE

## 2022-02-22 PROCEDURE — G9711 PT HX TOT COL OR COLON CA: HCPCS | Performed by: INTERNAL MEDICINE

## 2022-02-22 NOTE — PATIENT INSTRUCTIONS
Medicare Wellness Visit, Male    The best way to live healthy is to have a lifestyle where you eat a well-balanced diet, exercise regularly, limit alcohol use, and quit all forms of tobacco/nicotine, if applicable. Regular preventive services are another way to keep healthy. Preventive services (vaccines, screening tests, monitoring & exams) can help personalize your care plan, which helps you manage your own care. Screening tests can find health problems at the earliest stages, when they are easiest to treat. [facility] follows the current, evidence-based guidelines published by the [facility] ([facility]) when recommending preventive services for our patients. Because we follow these guidelines, sometimes recommendations change over time as research supports it. (For example, a prostate screening blood test is no longer routinely recommended for men with no symptoms). Of course, you and your doctor may decide to screen more often for some diseases, based on your risk and co-morbidities (chronic disease you are already diagnosed with). Preventive services for you include:  - Medicare offers their members a free annual wellness visit, which is time for you and your primary care provider to discuss and plan for your preventive service needs. Take advantage of this benefit every year!  -All adults over age 72 should receive the recommended pneumonia vaccines. Current USPSTF guidelines recommend a series of two vaccines for the best pneumonia protection.   -All adults should have a flu vaccine yearly and tetanus vaccine every 10 years.  -All adults age 48 and older should receive the shingles vaccines (series of two vaccines).        -All adults age 38-68 who are overweight should have a diabetes screening test once every three years.   -Other screening tests & preventive services for persons with diabetes include: an eye exam to screen for diabetic retinopathy, a kidney function test, a foot exam, and stricter control over your cholesterol.   -Cardiovascular screening for adults with routine risk involves an electrocardiogram (ECG) at intervals determined by the provider.   -Colorectal cancer screening should be done for adults age 54-65 with no increased risk factors for colorectal cancer. There are a number of acceptable methods of screening for this type of cancer. Each test has its own benefits and drawbacks. Discuss with your provider what is most appropriate for you during your annual wellness visit. The different tests include: colonoscopy (considered the best screening method), a fecal occult blood test, a fecal DNA test, and sigmoidoscopy.  -All adults born between [facility] should be screened once for Hepatitis C.  -An Abdominal Aortic Aneurysm (AAA) Screening is recommended for men age 73-68 who has ever smoked in their lifetime.      Here is a list of your current Health Maintenance items (your personalized list of preventive services) with a due date:  Health Maintenance Due   Topic Date Due    COVID-19 Vaccine (3 - Booster for Bass Peter series) 08/22/2021    Yearly Flu Vaccine (1) 09/01/2021    Depresssion Screening  02/16/2022    Annual Well Visit  02/17/2022

## 2022-02-22 NOTE — PROGRESS NOTES
This is the Subsequent Medicare Annual Wellness Exam, performed 12 months or more after the Initial AWV or the last Subsequent AWV    I have reviewed the patient's medical history in detail and updated the computerized patient record. Assessment/Plan   Education and counseling provided:  Are appropriate based on today's review and evaluation    1. Medicare annual wellness visit, subsequent       Depression Risk Factor Screening     3 most recent PHQ Screens 2/22/2022   Little interest or pleasure in doing things Not at all   Feeling down, depressed, irritable, or hopeless Not at all   Total Score PHQ 2 0       Alcohol & Drug Abuse Risk Screen    Do you average more than 1 drink per night or more than 7 drinks a week: No    In the past three months have you have had more than 4 drinks containing alcohol on one occasion: 4 drinks per week          Functional Ability and Level of Safety    Hearing: Hearing is good. Activities of Daily Living: The home contains: handrails and grab bars  Patient does total self care      Ambulation: with no difficulty     Fall Risk:  Fall Risk Assessment, last 12 mths 2/22/2022   Able to walk?  Yes   Fall in past 12 months? 0   Do you feel unsteady? -      Abuse Screen:  Patient is not abused   lives w wife      Alternate here and [place]    Cognitive Screening    Has your family/caregiver stated any concerns about your memory: no     Cognitive Screening: Normal - Mini Cog Test     No memory concerns   Pt knows the month, day and year   Can recall 3/3 objects     Health Maintenance Due     Health Maintenance Due   Topic Date Due    COVID-19 Vaccine (3 - Booster for Bass Peter series) 08/22/2021    Flu Vaccine (1) 09/01/2021    Depression Screen  02/16/2022    Medicare Yearly Exam  02/17/2022       Patient Care Team   Patient Care Team:  Kailee Worthington MD as PCP - General (Internal Medicine)  Kailee Worthington MD as PCP - [facility] Empaneled Provider  Gerldine Duane, MD (Urology)  Radha Brady MD (Dermatology)  Rosalia Richmond DDS (Dental General Practice)  Soni Nolan MD (Colon and Rectal Surgery)  Izzy Sharma MD (Orthopedic Surgery)  Marcy Benson MD (Urology)  Alethea Lozano MD (Hand Surgery)  Beatrice Shin MD (Colon and Rectal Surgery)  Garrett Arango MD (Dermatology)     upated    History     Patient Active Problem List   Diagnosis Code    BPH (benign prostatic hyperplasia) N40.0    PSA elevation R97.20    Dyslipidemia E78.5    Hepatitis K75.9    History of rectal cancer Z85.048    Encounter for screening colonoscopy Z12.11     Past Medical History:   Diagnosis Date    BPH (benign prostatic hyperplasia)     resolved    Colon cancer ([facility] [address] 1991    colectomy by Dr Ha Sorto, no chemo / radiation    Hepatitis A Late 1's    Pelvis fracture ([facility] [address] approx age 56    in traction for 6 weeks s/p falling off flag pole onto cement     Skin cancer 2014    Right hand skin cancer, removed      Past Surgical History:   Procedure Laterality Date    COLONOSCOPY N/A 8/11/2021    [facility]COPY    HX APPENDECTOMY  1962    HX COLONOSCOPY  3/2010    HX GI  1991    partial colon removal     HX ORTHOPAEDIC  approx age 56    right arm with titanium kai, fell from flag pole onto driveway    HX SKIN BIOPSY  09/2014    right hand CA, removed    HX TONSILLECTOMY      childhood     Current Outpatient Medications   Medication Sig Dispense Refill    omega-3 fatty acids-vitamin e (FISH OIL) 1,000 mg cap Take 1 Cap by mouth daily.  multivitamin (ONE A DAY) tablet Take 1 Tab by mouth daily.        No Known Allergies    Family History   Problem Relation Age of Onset    Hypertension Father     Cancer Paternal Uncle         Colon    Cancer Paternal Grandfather         Colon    Depression Mother     No Known Problems Sister     Heart Attack Paternal Grandmother     No Known Problems Son      Social History Tobacco Use    Smoking status: Never Smoker    Smokeless tobacco: Never Used   Substance Use Topics    Alcohol use: Yes     Alcohol/week: 2.0 standard drinks     Types: 2 Cans of beer per week     ACP not on file. SDM is his wife, and Renea Ramirez. He has this at home, he will bring it in          Colonoscopy: 8/11/21 with Dr Ericka Garcia 5 year repeat  aaa screen --not needed  PSA:  7/21 [facility] annual    OBTM: 5081  Pneumovax: 02/06/19   Prevnar 13: 1/2015  Shingrix: both rounds completed   Flu shot: 10/21  Covid: pfizer x 3      Eye exam: n 1/22 VEI    Hep C screen: 7/14, negative  Lipids: 2/21  due now  Fasting BS 2/21 due now      Medication reconciliation completed by MA and reviewed by me. Medical/surgical/social/family history reviewed and updated by me. Patient provided AVS and preventative screening table. Patient verbalized understanding of all information discussed.     Az Healy MD

## 2023-04-13 PROBLEM — C18.9 MALIGNANT NEOPLASM OF COLON, UNSPECIFIED PART OF COLON (HCC): Status: ACTIVE | Noted: 2023-04-13

## 2025-06-11 ENCOUNTER — CLINICAL DOCUMENTATION (OUTPATIENT)
Age: 80
End: 2025-06-11

## 2025-06-11 NOTE — PROGRESS NOTES
Received FLACO request from  [employer] on 6/11/25. Faxed request to [facility] on 6/11/25.

## (undated) DEVICE — Z DISCONTINUED PER MEDLINE LINE GAS SAMPLING O2/CO2 LNG AD 13 FT NSL W/ TBNG FILTERLINE

## (undated) DEVICE — NEEDLE HYPO 18GA L1.5IN PNK S STL HUB POLYPR SHLD REG BVL

## (undated) DEVICE — CATH IV AUTOGRD BC PNK 20GA 25 -- INSYTE

## (undated) DEVICE — SYR 3ML LL TIP 1/10ML GRAD --

## (undated) DEVICE — TOWEL 4 PLY TISS 19X30 SUE WHT

## (undated) DEVICE — SYR 10ML LUER LOK 1/5ML GRAD --

## (undated) DEVICE — 1200 GUARD II KIT W/5MM TUBE W/O VAC TUBE: Brand: GUARDIAN

## (undated) DEVICE — Device

## (undated) DEVICE — ELECTRODE,RADIOTRANSLUCENT,FOAM,5PK: Brand: MEDLINE

## (undated) DEVICE — NEONATAL-ADULT SPO2 SENSOR: Brand: NELLCOR

## (undated) DEVICE — BASIN EMSIS 16OZ GRAPHITE PLAS KID SHP MOLD GRAD FOR ORAL

## (undated) DEVICE — SET ADMIN 16ML TBNG L100IN 2 Y INJ SITE IV PIGGY BK DISP

## (undated) DEVICE — SOLIDIFIER FLD 2OZ 1500CC N DISINF IN BTL DISP SAFESORB